# Patient Record
Sex: FEMALE | Race: WHITE | NOT HISPANIC OR LATINO | Employment: FULL TIME | URBAN - METROPOLITAN AREA
[De-identification: names, ages, dates, MRNs, and addresses within clinical notes are randomized per-mention and may not be internally consistent; named-entity substitution may affect disease eponyms.]

---

## 2017-04-20 ENCOUNTER — TELEPHONE (OUTPATIENT)
Dept: PEDIATRICS | Facility: CLINIC | Age: 57
End: 2017-04-20

## 2017-04-20 NOTE — TELEPHONE ENCOUNTER
4/20/2017    Call Regarding Preventive Health Screening Colonoscopy    Attempt 1    Message on voicemail     Comments:       Outreach   CC

## 2017-05-02 NOTE — TELEPHONE ENCOUNTER
5/2/2017    Call Regarding Preventive Health Screening Colonoscopy    Attempt 1    Message on voicemail     Comments:       Outreach   cnt

## 2017-06-17 NOTE — TELEPHONE ENCOUNTER
Call Regarding Preventive Health Screening Colonoscopy    Attempt 3    Message     Comments: patient will call on own time      Outreach   Jolly Villalpando

## 2017-06-23 ENCOUNTER — DOCUMENTATION ONLY (OUTPATIENT)
Dept: LAB | Facility: CLINIC | Age: 57
End: 2017-06-23

## 2017-06-23 DIAGNOSIS — Z13.1 SCREENING FOR DIABETES MELLITUS: ICD-10-CM

## 2017-06-23 DIAGNOSIS — Z11.59 NEED FOR HEPATITIS C SCREENING TEST: ICD-10-CM

## 2017-06-23 DIAGNOSIS — Z13.29 SCREENING FOR THYROID DISORDER: ICD-10-CM

## 2017-06-23 DIAGNOSIS — Z13.6 CARDIOVASCULAR SCREENING; LDL GOAL LESS THAN 130: ICD-10-CM

## 2017-06-23 DIAGNOSIS — I10 ESSENTIAL HYPERTENSION WITH GOAL BLOOD PRESSURE LESS THAN 140/90: Primary | ICD-10-CM

## 2017-06-23 DIAGNOSIS — Z00.00 ENCOUNTER FOR ROUTINE ADULT HEALTH EXAMINATION WITHOUT ABNORMAL FINDINGS: ICD-10-CM

## 2017-06-23 NOTE — PROGRESS NOTES
See there are pending future labs, TSH, BMP and Lipid panel.    Routing to provider to review request for future lab appt.    Elda Cason RN, [facility]

## 2017-06-27 ENCOUNTER — OFFICE VISIT (OUTPATIENT)
Dept: PEDIATRICS | Facility: CLINIC | Age: 57
End: 2017-06-27
Payer: COMMERCIAL

## 2017-06-27 VITALS
HEART RATE: 57 BPM | TEMPERATURE: 98.3 F | WEIGHT: 228 LBS | SYSTOLIC BLOOD PRESSURE: 115 MMHG | HEIGHT: 65 IN | BODY MASS INDEX: 37.99 KG/M2 | OXYGEN SATURATION: 99 % | DIASTOLIC BLOOD PRESSURE: 70 MMHG

## 2017-06-27 DIAGNOSIS — Z00.00 ENCOUNTER FOR ROUTINE ADULT HEALTH EXAMINATION WITHOUT ABNORMAL FINDINGS: Primary | ICD-10-CM

## 2017-06-27 DIAGNOSIS — Z11.59 NEED FOR HEPATITIS C SCREENING TEST: ICD-10-CM

## 2017-06-27 DIAGNOSIS — D22.9 NUMEROUS MOLES: ICD-10-CM

## 2017-06-27 DIAGNOSIS — Z80.8 FAMILY HISTORY OF SKIN CANCER: ICD-10-CM

## 2017-06-27 DIAGNOSIS — I10 ESSENTIAL HYPERTENSION WITH GOAL BLOOD PRESSURE LESS THAN 140/90: ICD-10-CM

## 2017-06-27 DIAGNOSIS — Z12.31 ENCOUNTER FOR SCREENING MAMMOGRAM FOR BREAST CANCER: ICD-10-CM

## 2017-06-27 DIAGNOSIS — Z00.00 ENCOUNTER FOR ROUTINE ADULT HEALTH EXAMINATION WITHOUT ABNORMAL FINDINGS: ICD-10-CM

## 2017-06-27 DIAGNOSIS — Z71.89 ADVANCED DIRECTIVES, COUNSELING/DISCUSSION: ICD-10-CM

## 2017-06-27 DIAGNOSIS — Z12.11 SCREENING FOR COLON CANCER: ICD-10-CM

## 2017-06-27 DIAGNOSIS — Z13.6 CARDIOVASCULAR SCREENING; LDL GOAL LESS THAN 130: ICD-10-CM

## 2017-06-27 LAB
ALBUMIN SERPL-MCNC: 3.9 G/DL (ref 3.4–5)
ALP SERPL-CCNC: 78 U/L (ref 40–150)
ALT SERPL W P-5'-P-CCNC: 24 U/L (ref 0–50)
ANION GAP SERPL CALCULATED.3IONS-SCNC: 7 MMOL/L (ref 3–14)
AST SERPL W P-5'-P-CCNC: 14 U/L (ref 0–45)
BILIRUB SERPL-MCNC: 0.8 MG/DL (ref 0.2–1.3)
BUN SERPL-MCNC: 13 MG/DL (ref 7–30)
CALCIUM SERPL-MCNC: 8.8 MG/DL (ref 8.5–10.1)
CHLORIDE SERPL-SCNC: 108 MMOL/L (ref 94–109)
CHOLEST SERPL-MCNC: 153 MG/DL
CO2 SERPL-SCNC: 28 MMOL/L (ref 20–32)
CREAT SERPL-MCNC: 0.91 MG/DL (ref 0.52–1.04)
CREAT UR-MCNC: 32 MG/DL
GFR SERPL CREATININE-BSD FRML MDRD: 64 ML/MIN/1.7M2
GLUCOSE SERPL-MCNC: 88 MG/DL (ref 70–99)
HDLC SERPL-MCNC: 55 MG/DL
LDLC SERPL CALC-MCNC: 84 MG/DL
MICROALBUMIN UR-MCNC: <5 MG/L
MICROALBUMIN/CREAT UR: NORMAL MG/G CR (ref 0–25)
NONHDLC SERPL-MCNC: 98 MG/DL
POTASSIUM SERPL-SCNC: 4 MMOL/L (ref 3.4–5.3)
PROT SERPL-MCNC: 7.2 G/DL (ref 6.8–8.8)
SODIUM SERPL-SCNC: 143 MMOL/L (ref 133–144)
TRIGL SERPL-MCNC: 71 MG/DL

## 2017-06-27 PROCEDURE — 99396 PREV VISIT EST AGE 40-64: CPT | Performed by: NURSE PRACTITIONER

## 2017-06-27 PROCEDURE — 80053 COMPREHEN METABOLIC PANEL: CPT | Performed by: NURSE PRACTITIONER

## 2017-06-27 PROCEDURE — 86803 HEPATITIS C AB TEST: CPT | Performed by: NURSE PRACTITIONER

## 2017-06-27 PROCEDURE — 80061 LIPID PANEL: CPT | Performed by: NURSE PRACTITIONER

## 2017-06-27 PROCEDURE — 82043 UR ALBUMIN QUANTITATIVE: CPT | Performed by: NURSE PRACTITIONER

## 2017-06-27 PROCEDURE — 36415 COLL VENOUS BLD VENIPUNCTURE: CPT | Performed by: NURSE PRACTITIONER

## 2017-06-27 RX ORDER — LISINOPRIL 10 MG/1
TABLET ORAL
Qty: 90 TABLET | Refills: 3 | Status: SHIPPED | OUTPATIENT
Start: 2017-06-27 | End: 2018-09-11

## 2017-06-27 NOTE — MR AVS SNAPSHOT
After Visit Summary   6/27/2017    Susan Paulino    MRN: [id number]           Patient Information     Date Of Birth          1960        Visit Information        Provider Department      6/27/2017 11:00 AM Nova Azevedo APRN CNP [facility]        Today's Diagnoses     Encounter for routine adult health examination without abnormal findings    -  1    Essential hypertension with goal blood pressure less than 140/90        Need for hepatitis C screening test        Encounter for screening mammogram for breast cancer        Numerous moles        Family history of skin cancer        Advanced directives, counseling/discussion        Screening for colon cancer          Care Instructions    PLAN:   1.   Symptomatic therapy suggested: Continue current medications.  Increase calcium to 1000mg and 800iu Vit D    2.  Orders Placed This Encounter   Medications     lisinopril (PRINIVIL/ZESTRIL) 10 MG tablet     Sig: TAKE 1 TABLET (10 MG) BY MOUTH DAILY     Dispense:  90 tablet     Refill:  3     Orders Placed This Encounter   Procedures     MA Screening Digital Bilateral     Hepatitis C Screen Reflex to HCV RNA Quant and Genotype     DERMATOLOGY REFERRAL     GASTROENTEROLOGY ADULT REF PROCEDURE ONLY       3. Patient needs to follow up in if no improvement,or sooner if worsening of symptoms or other symptoms develop.  FURTHER TESTING:       - mammogram  I will place order. Please call [phone] to schedule.  Will follow up and/or notify patient of  results via My Chart to determine further need for followup  Please call [phone] to make appointment  if you do not hear from referrals in the next few days for colonoscopy     Follow up office visit in one year for annual health maintenance exam, sooner PRN.    Preventive Health Recommendations  Female Ages 50 - 64    Yearly exam: See your health care provider every year in order to  o Review health changes.   o Discuss  preventive care.    o Review your medicines if your doctor has prescribed any.      Get a Pap test every three years (unless you have an abnormal result and your provider advises testing more often).    If you get Pap tests with HPV test, you only need to test every 5 years, unless you have an abnormal result.     You do not need a Pap test if your uterus was removed (hysterectomy) and you have not had cancer.    You should be tested each year for STDs (sexually transmitted diseases) if you're at risk.     Have a mammogram every 1 to 2 years.    Have a colonoscopy at age 50, or have a yearly FIT test (stool test). These exams screen for colon cancer.      Have a cholesterol test every 5 years, or more often if advised.    Have a diabetes test (fasting glucose) every three years. If you are at risk for diabetes, you should have this test more often.     If you are at risk for osteoporosis (brittle bone disease), think about having a bone density scan (DEXA).    Shots: Get a flu shot each year. Get a tetanus shot every 10 years.    Nutrition:     Eat at least 5 servings of fruits and vegetables each day.    Eat whole-grain bread, whole-wheat pasta and brown rice instead of white grains and rice.    Talk to your provider about Calcium and Vitamin D.     Lifestyle    Exercise at least 150 minutes a week (30 minutes a day, 5 days a week). This will help you control your weight and prevent disease.    Limit alcohol to one drink per day.    No smoking.     Wear sunscreen to prevent skin cancer.     See your dentist every six months for an exam and cleaning.    See your eye doctor every 1 to 2 years.    It was a pleasure seeing you today at the [facility]. Thank you for allowing us to care for you today. We truly hope we provided you with the excellent service you deserve. Please let us know if there is anything else we can do for you so we can be sure you are leaving completley satisfied  with your care experience.       General information about your clinic   Clinic Hours Lab Hours (Appointments are required)   Mon-Thurs: 7:30 AM - 7 PM Mon-Thurs: 7:30 AM - 7 PM   Fri: 7:30 AM - 5 PM Fri: 7:30 AM - 5 PM        After Hours Nurse Advise & Appts:  [facility] Nurse Advisors: [phone]  Jose On Call: to make appointments anytime: [phone] On Call Physician: call [phone] and answering service will page the on call physician.        For urgent appointments, please call [phone] and ask for the triage nurse or your care team clinic nurse.  How to contact my care team:  MyChart: [email or web address]   Phone: [phone]   Fax: [phone]       [facility]:   Phone: [phone]  Hours: 8:00 AM - 6:00 PM  Medication Refills:  Call your pharmacy and they will forward the refill to us. Please allow 3 business days for your refills to be completed.       Normal or non-critical lab and imaging results will be communicated to you by MyChart, letter or phone within 7 days.  If you do not hear from us within 10 days, please call the clinic. If you have a critical or abnormal lab result, we will notify you by phone as soon as possible.       We now have PWIC (Pediatric Walk in Care)  Monday-Friday from 7:30-4. Simply walk in and be seen for your urgent needs like cough, fever, rash, diarrhea or vomiting, pink eye, UTI. No appointments needed. Ask one of the team for more information      -Your Care Team:    Dr. Hernán Terrell - Internal Medicine/Pediatrics   Dr. Suresh Cates - Family Medicine  Dr. Lesli Pelletier - Pediatrics  Nova Azevedo CNP - Family Practice Nurse Practitioner  Dr. Judi Herman - Pediatrics  Dr. Angelo Lawrence - Internal Medicine                    Follow-ups after your visit        Additional Services     DERMATOLOGY REFERRAL       Your provider has referred you to: [facility]: [facility] [phone]    [email or web address]    Please be aware that coverage of these services is subject to the terms and limitations of your health insurance plan.  Call member services at your health plan with any benefit or coverage questions.      Please bring the following with you to your appointment:    (1) Any X-Rays, CTs or MRIs which have been performed.  Contact the facility where they were done to arrange for  prior to your scheduled appointment.    (2) List of current medications  (3) This referral request   (4) Any documents/labs given to you for this referral            GASTROENTEROLOGY ADULT REF PROCEDURE ONLY                 Future tests that were ordered for you today     Open Future Orders        Priority Expected Expires Ordered    MA Screening Digital Bilateral Routine  6/27/2018 6/27/2017            Who to contact     If you have questions or need follow up information about today's clinic visit or your schedule please contact [facility] directly at [phone].  Normal or non-critical lab and imaging results will be communicated to you by [employer], letter or phone within 4 business days after the clinic has received the results. If you do not hear from us within 7 days, please contact the clinic through [employer] or phone. If you have a critical or abnormal lab result, we will notify you by phone as soon as possible.  Submit refill requests through [employer] or call your pharmacy and they will forward the refill request to us. Please allow 3 business days for your refill to be completed.          Additional Information About Your Visit        [employer] Information     [employer] gives you secure access to your electronic health record. If you see a primary care provider, you can also send messages to your care team and make appointments. If you have questions, please call your primary care clinic.  If you do not have a primary care provider, please call  "[phone] and they will assist you.      [employer] is an electronic gateway that provides easy, online access to your medical records. With [employer], you can request a clinic appointment, read your test results, renew a prescription or communicate with your care team.     To access your existing account, please contact your [facility] or call [phone] for assistance.        Care EveryWhere ID     This is your Care EveryWhere ID. This could be used by other organizations to access your [place] medical records  [id number]        Your Vitals Were     Pulse Temperature Height Pulse Oximetry Breastfeeding? BMI (Body Mass Index)    57 98.3  F (36.8  C) (Temporal) 5' 4.5\" (1.638 m) 99% No 38.53 kg/m2       Blood Pressure from Last 3 Encounters:   06/27/17 115/70   06/23/16 115/70   05/24/16 147/84    Weight from Last 3 Encounters:   06/27/17 228 lb (103.4 kg)   06/23/16 226 lb 12.8 oz (102.9 kg)   05/24/16 229 lb 14.4 oz (104.3 kg)              We Performed the Following     DERMATOLOGY REFERRAL     GASTROENTEROLOGY ADULT REF PROCEDURE ONLY     Hepatitis C Screen Reflex to HCV RNA Quant and Genotype          Today's Medication Changes          These changes are accurate as of: 6/27/17 11:56 AM.  If you have any questions, ask your nurse or doctor.               These medicines have changed or have updated prescriptions.        Dose/Directions    lisinopril 10 MG tablet   Commonly known as:  PRINIVIL/ZESTRIL   This may have changed:  See the new instructions.   Used for:  Essential hypertension with goal blood pressure less than 140/90   Changed by:  Nova Azevedo APRN CNP        TAKE 1 TABLET (10 MG) BY MOUTH DAILY   Quantity:  90 tablet   Refills:  3            Where to get your medicines      These medications were sent to [facility] [address] [place] [facility]  [address] [facility]  [address] [address] [facility] [address] [facility] [address]     Phone:  [phone]     lisinopril 10 MG " tablet                Primary Care Provider Office Phone # Fax #    Nova Azevedo, LULI [facility] [phone]       [facility] [address]  [place] [address]        Equal Access to Services     CELY ROSA : Hadii brianna ku hadchacortao Soomaali, waaxda luqadaha, qaybta kaalmada adeegyada, waxlisa idiin haylorenn adeabbie riojas elliot silva. So [facility] [phone].    ATENCIÓN: Si habla español, tiene a brody disposición servicios gratuitos de asistencia lingüística. Llame al [phone].    We comply with applicable federal civil rights laws and [place] laws. We do not discriminate on the basis of race, color, national origin, age, disability sex, sexual orientation or gender identity.            Thank you!     Thank you for choosing [facility]  for your care. Our goal is always to provide you with excellent care. Hearing back from our patients is one way we can continue to improve our services. Please take a few minutes to complete the written survey that you may receive in the mail after your visit with us. Thank you!             Your Updated Medication List - Protect others around you: Learn how to safely use, store and throw away your medicines at www.disposemymeds.org.          This list is accurate as of: 6/27/17 11:56 AM.  Always use your most recent med list.                   Brand Name Dispense Instructions for use Diagnosis    lisinopril 10 MG tablet    PRINIVIL/ZESTRIL    90 tablet    TAKE 1 TABLET (10 MG) BY MOUTH DAILY    Essential hypertension with goal blood pressure less than 140/90       MULTIVITAMIN ADULT PO      Take by mouth daily

## 2017-06-27 NOTE — PROGRESS NOTES
SUBJECTIVE:   CC: Susan Paulino is an 56 year old woman who presents for preventive health visit.     Physical   Annual:     Getting at least 3 servings of Calcium per day::  Yes    Bi-annual eye exam::  Yes    Dental care twice a year::  NO    Sleep apnea or symptoms of sleep apnea::  None    Diet::  Regular (no restrictions)    Frequency of exercise::  4-5 days/week    Duration of exercise::  30-45 minutes    Taking medications regularly::  Yes    Medication side effects::  None    Additional concerns today::  YES    Today's PHQ-2 Score: Answers for HPI/ROS submitted by the patient on 6/26/2017   PHQ-2 Score: 0    PHQ-2 ( 1999 [employer]) 6/26/2017   Q1: Little interest or pleasure in doing things Not at all   Q2: Feeling down, depressed or hopeless Not at all   PHQ-2 Score 0       Abuse: Current or Past(Physical, Sexual or Emotional)- No  Do you feel safe in your environment - Yes    Social History   Substance Use Topics     Smoking status: Never Smoker     Smokeless tobacco: Never Used     Alcohol use 0.0 oz/week      Comment: 2     The patient does not drink >3 drinks per day nor >7 drinks per week.    Reviewed orders with patient.  Reviewed health maintenance and updated orders accordingly - Yes  Labs reviewed in EPIC  BP Readings from Last 3 Encounters:   06/27/17 115/70   06/23/16 115/70   05/24/16 147/84    Wt Readings from Last 3 Encounters:   06/27/17 228 lb (103.4 kg)   06/23/16 226 lb 12.8 oz (102.9 kg)   05/24/16 229 lb 14.4 oz (104.3 kg)                 Patient Active Problem List   Diagnosis     Essential hypertension with goal blood pressure less than 140/90     CARDIOVASCULAR SCREENING; LDL GOAL LESS THAN 130     Past Surgical History:   Procedure Laterality Date     DRAIN PILONIDAL CYST SIMPL  1981     ORTHOPEDIC SURGERY      Right knee arthroscopic twice to clean meniscus     XR KNEE ARTHROGRAM RIGHT         Social History   Substance Use Topics     Smoking status: Never Smoker     Smokeless  tobacco: Never Used     Alcohol use 0.0 oz/week      Comment: 2     Family History   Problem Relation Age of Onset     Pancreatic Cancer Mother      Prostate Cancer Father      Hyperlipidemia Father      DIABETES Maternal Grandmother      Parkinsonism Sister      Breast Cancer Sister      Thyroid Disease Sister      DIABETES Maternal Aunt      Colon Cancer Maternal Uncle      Thyroid Disease Sister      Other Cancer Brother      Coronary Artery Disease No family hx of      Hypertension No family hx of      CEREBROVASCULAR DISEASE No family hx of      Depression No family hx of      Anxiety Disorder No family hx of      Asthma No family hx of      Genetic Disorder No family hx of          Current Outpatient Prescriptions   Medication Sig Dispense Refill     lisinopril (PRINIVIL/ZESTRIL) 10 MG tablet TAKE 1 TABLET (10 MG) BY MOUTH DAILY 90 tablet 0     Multiple Vitamins-Minerals (MULTIVITAMIN ADULT PO) Take by mouth daily       No Known Allergies    Patient over age 50, mutual decision to screen reflected in health maintenance.    Pertinent mammograms are reviewed under the imaging tab.  History of abnormal Pap smear: NO - age 30- 65 PAP every 3 years recommended    Reviewed and updated as needed this visit by clinical staff  Tobacco  Allergies  Meds  Med Hx  Surg Hx  Fam Hx  Soc Hx        Reviewed and updated as needed this visit by Provider        Past Medical History:   Diagnosis Date     Hypertension       Past Surgical History:   Procedure Laterality Date     DRAIN PILONIDAL CYST SIMPL  1981     ORTHOPEDIC SURGERY      Right knee arthroscopic twice to clean meniscus     XR KNEE ARTHROGRAM RIGHT         ROS:  CONSTITUTIONAL:NEGATIVE for fever, chills, change in weight  INTEGUMENTARY/SKIN: NEGATIVE for changing lesion  and POSITIVE for brother with recent skin cancer. Has numerous moles    EYES: NEGATIVE for vision changes or irritation  ENT: NEGATIVE for ear, mouth and throat problems  RESP:NEGATIVE for  "significant cough or SOB  BREAST: NEGATIVE for masses, tenderness or discharge  CV: NEGATIVE for chest pain, palpitations or peripheral edema  GI: NEGATIVE for nausea, abdominal pain, heartburn, or change in bowel habits   menopausal female: amenorrhea, no unusual urinary symptoms and no unusual vaginal symptoms  MUSCULOSKELETAL:NEGATIVE for significant arthralgias or myalgia  NEURO: NEGATIVE for weakness, dizziness or paresthesias  ENDOCRINE: NEGATIVE for temperature intolerance, skin/hair changes  HEME/ALLERGY/IMMUNE: NEGATIVE for allergies and anemia  PSYCHIATRIC: NEGATIVE for changes in mood or affect        OBJECTIVE:   /70 (BP Location: Right arm, Patient Position: Sitting, Cuff Size: Adult Large)  Pulse 57  Temp 98.3  F (36.8  C) (Temporal)  Ht 5' 4.5\" (1.638 m)  Wt 228 lb (103.4 kg)  SpO2 99%  Breastfeeding? No  BMI 38.53 kg/m2   Wt Readings from Last 4 Encounters:   06/27/17 228 lb (103.4 kg)   06/23/16 226 lb 12.8 oz (102.9 kg)   05/24/16 229 lb 14.4 oz (104.3 kg)       EXAM:  GENERAL: healthy, alert and no distress  EYES: Eyes grossly normal to inspection, PERRL and conjunctivae and sclerae normal  HENT: ear canals and TM's normal, nose and mouth without ulcers or lesions  NECK: no adenopathy, no asymmetry, masses, or scars and thyroid normal to palpation  RESP: lungs clear to auscultation - no rales, rhonchi or wheezes  BREAST: normal without masses, tenderness or nipple discharge and no palpable axillary masses or adenopathy  CV: regular rate and rhythm, normal S1 S2, no S3 or S4, no murmur, click or rub, no peripheral edema and peripheral pulses strong  ABDOMEN: soft, nontender, no hepatosplenomegaly, no masses and bowel sounds normal   (female): normal female external genitalia, normal urethral meatus, vaginal mucosa pink, moist, well rugated, and normal cervix/adnexa/uterus without masses or discharge  RECTAL: deferred  MS: no gross musculoskeletal defects noted, no edema  SKIN: no " suspicious lesions or rashes  NEURO: Normal strength and tone, mentation intact and speech normal  PSYCH: mentation appears normal, affect normal/bright  LYMPH: no cervical, supraclavicular, axillary, or inguinal adenopathy    ASSESSMENT/PLAN:   Susan was seen today for physical.    Diagnoses and all orders for this visit:    Encounter for routine adult health examination without abnormal findings  -     Hepatitis C Screen Reflex to HCV RNA Quant and Genotype    Essential hypertension with goal blood pressure less than 140/90  -     lisinopril (PRINIVIL/ZESTRIL) 10 MG tablet; TAKE 1 TABLET (10 MG) BY MOUTH DAILY    Need for hepatitis C screening test  -     Hepatitis C Screen Reflex to HCV RNA Quant and Genotype    Encounter for screening mammogram for breast cancer  -     MA Screening Digital Bilateral; Future    Numerous moles  -     DERMATOLOGY REFERRAL    Family history of skin cancer  -     DERMATOLOGY REFERRAL    Advanced directives, counseling/discussion  Advance Care Planning 6/27/2017: ACP Review of Chart / Resources Provided:  Reviewed chart for advance care plan.  Susan Paulino has been provided information and resources to begin or update their advance care plan.  Added by Nova Azevedo    Screening for colon cancer  -     GASTROENTEROLOGY ADULT REF PROCEDURE ONLY        COUNSELING:  Reviewed preventive health counseling, as reflected in patient instructions  Special attention given to:        Regular exercise       Healthy diet/nutrition       Vision screening       Osteoporosis Prevention/Bone Health       Colon cancer screening       Consider Hep C screening for patients born between 1945 and 1965       The 10-year ASCVD risk score (Zoiladillon DOUGHERTY Jr, et al., 2013) is: 1.6%    Values used to calculate the score:      Age: 56 years      Sex: Female      Is Non- : No      Diabetic: No      Tobacco smoker: No      Systolic Blood Pressure: 115 mmHg      Is BP treated: Yes     "  HDL Cholesterol: 55 mg/dL      Total Cholesterol: 153 mg/dL         reports that she has never smoked. She has never used smokeless tobacco.    Estimated body mass index is 38.33 kg/(m^2) as calculated from the following:    Height as of 6/23/16: 5' 4.5\" (1.638 m).    Weight as of 6/23/16: 226 lb 12.8 oz (102.9 kg).   Weight management plan: Discussed healthy diet and exercise guidelines and patient will follow up in 12 months in clinic to re-evaluate.    Counseling Resources:  ATP IV Guidelines  Pooled Cohorts Equation Calculator  Breast Cancer Risk Calculator  FRAX Risk Assessment  ICSI Preventive Guidelines  Dietary Guidelines for Americans, 2010  USDA's MyPlate  ASA Prophylaxis  Lung CA Screening    LULI Jacobsen CNP  [facility]  "

## 2017-06-27 NOTE — PATIENT INSTRUCTIONS
PLAN:   1.   Symptomatic therapy suggested: Continue current medications.  Increase calcium to 1000mg and 800iu Vit D    2.  Orders Placed This Encounter   Medications     lisinopril (PRINIVIL/ZESTRIL) 10 MG tablet     Sig: TAKE 1 TABLET (10 MG) BY MOUTH DAILY     Dispense:  90 tablet     Refill:  3     Orders Placed This Encounter   Procedures     MA Screening Digital Bilateral     Hepatitis C Screen Reflex to HCV RNA Quant and Genotype     DERMATOLOGY REFERRAL     GASTROENTEROLOGY ADULT REF PROCEDURE ONLY       3. Patient needs to follow up in if no improvement,or sooner if worsening of symptoms or other symptoms develop.  FURTHER TESTING:       - mammogram  I will place order. Please call [phone] to schedule.  Will follow up and/or notify patient of  results via My Chart to determine further need for followup  Please call [phone] to make appointment  if you do not hear from referrals in the next few days for colonoscopy     Follow up office visit in one year for annual health maintenance exam, sooner PRN.    Preventive Health Recommendations  Female Ages 50 - 64    Yearly exam: See your health care provider every year in order to  o Review health changes.   o Discuss preventive care.    o Review your medicines if your doctor has prescribed any.      Get a Pap test every three years (unless you have an abnormal result and your provider advises testing more often).    If you get Pap tests with HPV test, you only need to test every 5 years, unless you have an abnormal result.     You do not need a Pap test if your uterus was removed (hysterectomy) and you have not had cancer.    You should be tested each year for STDs (sexually transmitted diseases) if you're at risk.     Have a mammogram every 1 to 2 years.    Have a colonoscopy at age 50, or have a yearly FIT test (stool test). These exams screen for colon cancer.      Have a cholesterol test every 5 years, or more often if advised.    Have a diabetes  test (fasting glucose) every three years. If you are at risk for diabetes, you should have this test more often.     If you are at risk for osteoporosis (brittle bone disease), think about having a bone density scan (DEXA).    Shots: Get a flu shot each year. Get a tetanus shot every 10 years.    Nutrition:     Eat at least 5 servings of fruits and vegetables each day.    Eat whole-grain bread, whole-wheat pasta and brown rice instead of white grains and rice.    Talk to your provider about Calcium and Vitamin D.     Lifestyle    Exercise at least 150 minutes a week (30 minutes a day, 5 days a week). This will help you control your weight and prevent disease.    Limit alcohol to one drink per day.    No smoking.     Wear sunscreen to prevent skin cancer.     See your dentist every six months for an exam and cleaning.    See your eye doctor every 1 to 2 years.    It was a pleasure seeing you today at the [facility]. Thank you for allowing us to care for you today. We truly hope we provided you with the excellent service you deserve. Please let us know if there is anything else we can do for you so we can be sure you are leaving completley satisfied with your care experience.       General information about your clinic   Clinic Hours Lab Hours (Appointments are required)   Mon-Thurs: 7:30 AM - 7 PM Mon-Thurs: 7:30 AM - 7 PM   Fri: 7:30 AM - 5 PM Fri: 7:30 AM - 5 PM        After Hours Nurse Advise & Appts:  [facility] Nurse Advisors: [phone]  [facility] On Call: to make appointments anytime: [phone] On Call Physician: call [phone] and answering service will page the on call physician.        For urgent appointments, please call [phone] and ask for the triage nurse or your care team clinic nurse.  How to contact my care team:  MyChart: [email or web address]   Phone: [phone]   Fax: [phone]       [facility]:   Phone: [phone]  Hours: 8:00 AM - 6:00 PM   Medication Refills:  Call your pharmacy and they will forward the refill to us. Please allow 3 business days for your refills to be completed.       Normal or non-critical lab and imaging results will be communicated to you by MyChart, letter or phone within 7 days.  If you do not hear from us within 10 days, please call the clinic. If you have a critical or abnormal lab result, we will notify you by phone as soon as possible.       We now have PWIC (Pediatric Walk in Care)  Monday-Friday from 7:30-4. Simply walk in and be seen for your urgent needs like cough, fever, rash, diarrhea or vomiting, pink eye, UTI. No appointments needed. Ask one of the team for more information      -Your Care Team:    Dr. Hernán Terrell - Internal Medicine/Pediatrics   Dr. Suersh Cates - Family Medicine  Dr. Lesli Pelletier - Pediatrics  Nova Azevedo CNP - Family Practice Nurse Practitioner  Dr. Judi Herman - Pediatrics  Dr. Angelo Lawrence - Internal Medicine

## 2017-06-27 NOTE — NURSING NOTE
"Chief Complaint   Patient presents with     Physical     AFE       Initial /70 (BP Location: Right arm, Patient Position: Sitting, Cuff Size: Adult Large)  Pulse 57  Temp 98.3  F (36.8  C) (Temporal)  Ht 5' 4.5\" (1.638 m)  Wt 228 lb (103.4 kg)  SpO2 99%  Breastfeeding? No  BMI 38.53 kg/m2 Estimated body mass index is 38.53 kg/(m^2) as calculated from the following:    Height as of this encounter: 5' 4.5\" (1.638 m).    Weight as of this encounter: 228 lb (103.4 kg).  Medication Reconciliation: complete      BRYAN Armstrong      "

## 2017-06-27 NOTE — PROGRESS NOTES
Guero Paulino,    Attached are your test results.  -Liver and gallbladder tests are normal. (ALT,AST, Alk phos, bilirubin), kidney function is normal (Cr, GFR), Sodium is normal, Potassium is normal, Calcium is normal, Glucose is normal (diabetes screening test).   -Cholesterol levels (LDL,HDL, Triglycerides) are normal.  ADVISE: rechecking in 1 year.  -Microalbumin (urine protein) test is normal.  ADVISE: recheck annually   Please contact us if you have any questions.    oNva Azevedo, CNP

## 2017-06-28 LAB — HCV AB SERPL QL IA: NORMAL

## 2017-06-29 NOTE — PROGRESS NOTES
Guero Paulino,    Attached are your test results.  -Hepatitis C antibody screen test shows no signs of a previous hepatitis C infection.   Please contact us if you have any questions.    Nova Azevedo, CNP

## 2017-07-07 ENCOUNTER — RADIANT APPOINTMENT (OUTPATIENT)
Dept: MAMMOGRAPHY | Facility: CLINIC | Age: 57
End: 2017-07-07
Attending: NURSE PRACTITIONER
Payer: COMMERCIAL

## 2017-07-07 DIAGNOSIS — Z12.31 ENCOUNTER FOR SCREENING MAMMOGRAM FOR BREAST CANCER: ICD-10-CM

## 2017-07-07 PROCEDURE — 77063 BREAST TOMOSYNTHESIS BI: CPT | Performed by: RADIOLOGY

## 2017-07-07 PROCEDURE — G0202 SCR MAMMO BI INCL CAD: HCPCS | Performed by: RADIOLOGY

## 2018-02-07 ENCOUNTER — TRANSFERRED RECORDS (OUTPATIENT)
Dept: HEALTH INFORMATION MANAGEMENT | Facility: CLINIC | Age: 58
End: 2018-02-07

## 2018-02-24 ENCOUNTER — TELEPHONE (OUTPATIENT)
Dept: PEDIATRICS | Facility: CLINIC | Age: 58
End: 2018-02-24

## 2018-09-11 DIAGNOSIS — I10 ESSENTIAL HYPERTENSION WITH GOAL BLOOD PRESSURE LESS THAN 140/90: ICD-10-CM

## 2018-09-11 RX ORDER — LISINOPRIL 10 MG/1
TABLET ORAL
Qty: 30 TABLET | Refills: 0 | Status: SHIPPED | OUTPATIENT
Start: 2018-09-11 | End: 2018-12-07

## 2018-09-11 NOTE — LETTER
September 11, 2018      Susan Paulino  [address] [place] [address]              Dear Susan,      We recently received a call from your pharmacy requesting a refill of your medication. We have provided a 30 day refill of your medication, lisinopril (PRINIVIL/ZESTRIL) 10 MG tablet, as requested.      A review of your chart indicates that your last office visit was on 6/27/2017.  An appointment is required yearly with your provider.    We have authorized a one time refill of your medication to allow time for you to schedule.     Please come in fasting for the appointment. Fasting entails nothing to eat or drink 8 hours prior to your appointment; with the exception on water. You may take your medication the day of the appointment.    Please call the clinic to schedule your appointment.    Thank you for taking an active role in your healthcare.      Sincerely,    [facility]

## 2018-09-11 NOTE — TELEPHONE ENCOUNTER
lisinopril (PRINIVIL/ZESTRIL) 10 MG tablet 90 tablet 3 6/27/2017  No   Sig: TAKE 1 TABLET (10 MG) BY MOUTH DAILY     Last OV with GUS Azevedo CNP: 6/27/2017    No future apts.     BP Readings from Last 3 Encounters:   06/27/17 115/70   06/23/16 115/70   05/24/16 147/84     Creatinine   Date Value Ref Range Status   06/27/2017 0.91 0.52 - 1.04 mg/dL Final     Potassium   Date Value Ref Range Status   06/27/2017 4.0 3.4 - 5.3 mmol/L Final     ACE Inhibitors (Including Combos) Protocol Failed9/11 1:48 AM   Blood pressure under 140/90 in past 12 months    Recent (12 mo) or future (30 days) visit within the authorizing provider's specialty    Normal serum creatinine on file in past 12 months    Normal serum potassium on file in past 12 months    Patient is age 18 or older    No active pregnancy on record    No positive pregnancy test in past 12 months     Patient due for an annual physical and annual lab work. Serenity refill. Patient notified by letter.     Phoebe Boone RN

## 2018-10-11 DIAGNOSIS — I10 ESSENTIAL HYPERTENSION WITH GOAL BLOOD PRESSURE LESS THAN 140/90: ICD-10-CM

## 2018-10-11 NOTE — TELEPHONE ENCOUNTER
Routing to scheduling to please call patient to schedule an annual physical with Nova Azevedo CNP.    Please route back to primary care once scheduled or for any questions/concerns. A second norris refill will be authorized once scheduled.    lisinopril (PRINIVIL/ZESTRIL) 10 MG tablet 30 tablet 0 9/11/2018  No   Sig: TAKE 1 TABLET BY MOUTH DAILY   Class: E-Prescribe   Notes to Pharmacy: Patient due for an annual physical and annual lab work. Norris refill. Patient notified by letter.     Last OV with GUS Azevedo CNP: 6/27/2017    No future apts.     BP Readings from Last 3 Encounters:   06/27/17 115/70   06/23/16 115/70   05/24/16 147/84     Creatinine   Date Value Ref Range Status   06/27/2017 0.91 0.52 - 1.04 mg/dL Final     Potassium   Date Value Ref Range Status   06/27/2017 4.0 3.4 - 5.3 mmol/L Final     ACE Inhibitors (Including Combos) Protocol Xdslox47/11 2:08 AM   Blood pressure under 140/90 in past 12 months    Recent (12 mo) or future (30 days) visit within the authorizing provider's specialty    Normal serum creatinine on file in past 12 months    Normal serum potassium on file in past 12 months    Patient is age 18 or older    No active pregnancy on record    No positive pregnancy test in past 12 months     Phoebe Boone RN

## 2018-10-11 NOTE — LETTER
October 19, 2018      Susan Paulino  [address] [place] [address]              Dear Susan,      The refill request made by your pharmacy was received at the clinic.     Refused Prescriptions:                         lisinopril (PRINIVIL/ZESTRIL) 10 MG tablet        Sig: TAKE 1 TABLET BY MOUTH EVERY DAY  Refused By: ROYA VIEYRA  Reason for Refusal: Patient needs appointment    At this time you are overdue in the clinic for a follow up appointment. Your refill request has been denied until you are seen for an office visit. Please contact the clinic at [phone] to schedule an appointment as soon as possible.    Sincerely,    Your [facility] Team

## 2018-10-11 NOTE — TELEPHONE ENCOUNTER
Attempt # 2    Left message for patient stating prescription request received.  Serenity refill was given last month. Advised patient to call clinic to schedule Physical appointment and fasting labs with Judith Morris. Clinic number and Mychart option given.    Farhana Downs  Primary Care   [facility] [place]

## 2018-10-19 RX ORDER — LISINOPRIL 10 MG/1
TABLET ORAL
Qty: 30 TABLET | Refills: 0 | OUTPATIENT
Start: 2018-10-19

## 2018-10-19 NOTE — TELEPHONE ENCOUNTER
Patient hasn't responded to voicemails or read [facility].    Routing back to clinic to review.    Farhana Downs  Primary Care   [facility] [place]

## 2018-10-19 NOTE — TELEPHONE ENCOUNTER
Routing to PCP for review. 2nd norris refill pending.     Last OV with PCP: 6/27/2017    Norris refill and letter sent on: 9/11/18     attempted to reach patient by phone 3x's plus RADSON[employer] message with no response from patient on: 10/11-10/18    Phoebe Boone RN

## 2018-11-21 ENCOUNTER — DOCUMENTATION ONLY (OUTPATIENT)
Dept: LAB | Facility: CLINIC | Age: 58
End: 2018-11-21

## 2018-11-21 DIAGNOSIS — I10 ESSENTIAL HYPERTENSION WITH GOAL BLOOD PRESSURE LESS THAN 140/90: ICD-10-CM

## 2018-11-21 DIAGNOSIS — Z13.6 CARDIOVASCULAR SCREENING; LDL GOAL LESS THAN 130: Primary | ICD-10-CM

## 2018-11-21 DIAGNOSIS — Z13.1 SCREENING FOR DIABETES MELLITUS: ICD-10-CM

## 2018-11-21 DIAGNOSIS — Z13.29 SCREENING FOR THYROID DISORDER: ICD-10-CM

## 2018-11-21 DIAGNOSIS — Z00.00 ENCOUNTER FOR ROUTINE ADULT HEALTH EXAMINATION WITHOUT ABNORMAL FINDINGS: ICD-10-CM

## 2018-11-21 DIAGNOSIS — Z12.31 ENCOUNTER FOR SCREENING MAMMOGRAM FOR BREAST CANCER: ICD-10-CM

## 2018-11-21 NOTE — PROGRESS NOTES
Pending lab orders for 11/26/18 (physical). Please review, sign, and close encounter. Thank you. Phoeeb Boone RN

## 2018-11-26 DIAGNOSIS — Z00.00 ENCOUNTER FOR ROUTINE ADULT HEALTH EXAMINATION WITHOUT ABNORMAL FINDINGS: ICD-10-CM

## 2018-11-26 DIAGNOSIS — Z13.1 SCREENING FOR DIABETES MELLITUS: ICD-10-CM

## 2018-11-26 DIAGNOSIS — Z13.6 CARDIOVASCULAR SCREENING; LDL GOAL LESS THAN 130: ICD-10-CM

## 2018-11-26 DIAGNOSIS — Z13.29 SCREENING FOR THYROID DISORDER: ICD-10-CM

## 2018-11-26 DIAGNOSIS — R79.89 ELEVATED TSH: Primary | ICD-10-CM

## 2018-11-26 DIAGNOSIS — I10 ESSENTIAL HYPERTENSION WITH GOAL BLOOD PRESSURE LESS THAN 140/90: ICD-10-CM

## 2018-11-26 LAB
ALBUMIN SERPL-MCNC: 3.6 G/DL (ref 3.4–5)
ALP SERPL-CCNC: 82 U/L (ref 40–150)
ALT SERPL W P-5'-P-CCNC: 30 U/L (ref 0–50)
ANION GAP SERPL CALCULATED.3IONS-SCNC: 5 MMOL/L (ref 3–14)
AST SERPL W P-5'-P-CCNC: 18 U/L (ref 0–45)
BILIRUB SERPL-MCNC: 0.6 MG/DL (ref 0.2–1.3)
BUN SERPL-MCNC: 13 MG/DL (ref 7–30)
CALCIUM SERPL-MCNC: 8.6 MG/DL (ref 8.5–10.1)
CHLORIDE SERPL-SCNC: 107 MMOL/L (ref 94–109)
CHOLEST SERPL-MCNC: 176 MG/DL
CO2 SERPL-SCNC: 27 MMOL/L (ref 20–32)
CREAT SERPL-MCNC: 0.77 MG/DL (ref 0.52–1.04)
CREAT UR-MCNC: 69 MG/DL
GFR SERPL CREATININE-BSD FRML MDRD: 78 ML/MIN/1.7M2
GLUCOSE SERPL-MCNC: 95 MG/DL (ref 70–99)
HDLC SERPL-MCNC: 53 MG/DL
LDLC SERPL CALC-MCNC: 97 MG/DL
MICROALBUMIN UR-MCNC: <5 MG/L
MICROALBUMIN/CREAT UR: NORMAL MG/G CR (ref 0–25)
NONHDLC SERPL-MCNC: 123 MG/DL
POTASSIUM SERPL-SCNC: 3.8 MMOL/L (ref 3.4–5.3)
PROT SERPL-MCNC: 7 G/DL (ref 6.8–8.8)
SODIUM SERPL-SCNC: 139 MMOL/L (ref 133–144)
T4 FREE SERPL-MCNC: 0.92 NG/DL (ref 0.76–1.46)
TRIGL SERPL-MCNC: 132 MG/DL
TSH SERPL DL<=0.005 MIU/L-ACNC: 7.69 MU/L (ref 0.4–4)

## 2018-11-26 PROCEDURE — 82043 UR ALBUMIN QUANTITATIVE: CPT | Performed by: NURSE PRACTITIONER

## 2018-11-26 PROCEDURE — 84443 ASSAY THYROID STIM HORMONE: CPT | Performed by: NURSE PRACTITIONER

## 2018-11-26 PROCEDURE — 84439 ASSAY OF FREE THYROXINE: CPT | Performed by: NURSE PRACTITIONER

## 2018-11-26 PROCEDURE — 80053 COMPREHEN METABOLIC PANEL: CPT | Performed by: NURSE PRACTITIONER

## 2018-11-26 PROCEDURE — 36415 COLL VENOUS BLD VENIPUNCTURE: CPT | Performed by: NURSE PRACTITIONER

## 2018-11-26 PROCEDURE — 80061 LIPID PANEL: CPT | Performed by: NURSE PRACTITIONER

## 2018-11-28 NOTE — PROGRESS NOTES
Guero Paulino,    Attached are your test results.  -Cholesterol levels (LDL,HDL, Triglycerides) are normal.  ADVISE: rechecking in 1 year.   -Liver and gallbladder tests are normal (ALT,AST, Alk phos, bilirubin), kidney function is normal (Cr, GFR), sodium is normal, potassium is normal, calcium is normal, glucose is normal.  -TSH (thyroid stimulating hormone) level can  indicates hypothyroidism (an underactive thyroid).  This can cause a number of symptoms including weight gain, fatigue, high cholesterol, constipation, hair loss, cold intolerance).  ADVISE:rechecking your TSH in 1 month.   -Microalbumin (urine protein) test is normal.  ADVISE: rechecking this annually.   Please contact us if you have any questions.    Nova Azevedo, CNP

## 2018-12-07 ENCOUNTER — OFFICE VISIT (OUTPATIENT)
Dept: PEDIATRICS | Facility: CLINIC | Age: 58
End: 2018-12-07
Payer: COMMERCIAL

## 2018-12-07 VITALS
HEART RATE: 75 BPM | DIASTOLIC BLOOD PRESSURE: 70 MMHG | HEIGHT: 65 IN | TEMPERATURE: 98.8 F | SYSTOLIC BLOOD PRESSURE: 132 MMHG | OXYGEN SATURATION: 99 % | WEIGHT: 229.6 LBS | BODY MASS INDEX: 38.25 KG/M2

## 2018-12-07 DIAGNOSIS — Z00.00 ENCOUNTER FOR ROUTINE ADULT HEALTH EXAMINATION WITHOUT ABNORMAL FINDINGS: Primary | ICD-10-CM

## 2018-12-07 DIAGNOSIS — I10 ESSENTIAL HYPERTENSION WITH GOAL BLOOD PRESSURE LESS THAN 140/90: ICD-10-CM

## 2018-12-07 DIAGNOSIS — Z12.11 SCREENING FOR COLON CANCER: ICD-10-CM

## 2018-12-07 DIAGNOSIS — Z12.31 ENCOUNTER FOR SCREENING MAMMOGRAM FOR BREAST CANCER: ICD-10-CM

## 2018-12-07 DIAGNOSIS — Z23 NEED FOR PROPHYLACTIC VACCINATION AND INOCULATION AGAINST INFLUENZA: ICD-10-CM

## 2018-12-07 PROCEDURE — 90471 IMMUNIZATION ADMIN: CPT | Performed by: NURSE PRACTITIONER

## 2018-12-07 PROCEDURE — 90682 RIV4 VACC RECOMBINANT DNA IM: CPT | Performed by: NURSE PRACTITIONER

## 2018-12-07 PROCEDURE — 99396 PREV VISIT EST AGE 40-64: CPT | Mod: 25 | Performed by: NURSE PRACTITIONER

## 2018-12-07 RX ORDER — LISINOPRIL 10 MG/1
10 TABLET ORAL DAILY
Qty: 90 TABLET | Refills: 3 | Status: SHIPPED | OUTPATIENT
Start: 2018-12-07 | End: 2019-11-21

## 2018-12-07 NOTE — PATIENT INSTRUCTIONS
PLAN:   1.   Symptomatic therapy suggested: Increase calcium to 1000mg and 1000iu Vit D  Continue current medications.  Start on Lotrimin cream for your feet   2.  Orders Placed This Encounter   Medications     lisinopril (PRINIVIL/ZESTRIL) 10 MG tablet     Sig: Take 1 tablet (10 mg) by mouth daily     Dispense:  90 tablet     Refill:  3     Orders Placed This Encounter   Procedures     MA Screening Digital Bilateral     FLU VACCINE, (RIV4) RECOMBINANT HA  , IM (FluBlok, egg free) [27782]- >18 YRS ([facility] recommended  50-64 YRS)     Vaccine Administration, Initial [76450]     GASTROENTEROLOGY ADULT REF PROCEDURE ONLY [facility] [phone]; No Provider Preference       3. Patient needs to follow up in if no improvement,or sooner if worsening of symptoms or other symptoms develop.  FURTHER TESTING:       - mammogram  Refer for colon  Work on weight loss  Regular exercise  Will follow up and/or notify patient of  results via My Chart to determine further need for followup  Follow up office visit in one year for annual health maintenance exam, sooner PRN.    Preventive Health Recommendations  Female Ages 50 - 64    Yearly exam: See your health care provider every year in order to  o Review health changes.   o Discuss preventive care.    o Review your medicines if your doctor has prescribed any.      Get a Pap test every three years (unless you have an abnormal result and your provider advises testing more often).    If you get Pap tests with HPV test, you only need to test every 5 years, unless you have an abnormal result.     You do not need a Pap test if your uterus was removed (hysterectomy) and you have not had cancer.    You should be tested each year for STDs (sexually transmitted diseases) if you're at risk.     Have a mammogram every 1 to 2 years.    Have a colonoscopy at age 50, or have a yearly FIT test (stool test). These exams screen for colon cancer.      Have a cholesterol test every 5 years, or more  often if advised.    Have a diabetes test (fasting glucose) every three years. If you are at risk for diabetes, you should have this test more often.     If you are at risk for osteoporosis (brittle bone disease), think about having a bone density scan (DEXA).    Shots: Get a flu shot each year. Get a tetanus shot every 10 years.    Nutrition:     Eat at least 5 servings of fruits and vegetables each day.    Eat whole-grain bread, whole-wheat pasta and brown rice instead of white grains and rice.    Get adequate Calcium and Vitamin D.     Lifestyle    Exercise at least 150 minutes a week (30 minutes a day, 5 days a week). This will help you control your weight and prevent disease.    Limit alcohol to one drink per day.    No smoking.     Wear sunscreen to prevent skin cancer.     See your dentist every six months for an exam and cleaning.    See your eye doctor every 1 to 2 years.  It was a pleasure seeing you today at the [facility]. Thank you for allowing us to care for you today. We truly hope we provided you with the excellent service you deserve. Please let us know if there is anything else we can do for you so we can be sure you are leaving completley satisfied with your care experience.       General information about your clinic   Clinic Hours Lab Hours (Appointments are required)   Mon-Thurs: 7:30 AM - 7 PM Mon-Thurs: 7:30 AM - 7 PM   Fri: 7:30 AM - 5 PM Fri: 7:30 AM - 5 PM        After Hours Nurse Advise & Appts:  [facility] Nurse Advisors: [phone]  [facility] On Call: to make appointments anytime: [phone] On Call Physician: call [phone] and answering service will page the on call physician.        For urgent appointments, please call [phone] and ask for the triage nurse or your care team clinic nurse.  How to contact my care team:  MyChart: [email or web address]   Phone: [phone]   Fax: [phone]       [facility]:   Phone:  [phone]  Hours: 8:00 AM - 6:00 PM  Medication Refills:  Call your pharmacy and they will forward the refill to us. Please allow 3 business days for your refills to be completed.       Normal or non-critical lab and imaging results will be communicated to you by MyChart, letter or phone within 7 days.  If you do not hear from us within 10 days, please call the clinic. If you have a critical or abnormal lab result, we will notify you by phone as soon as possible.       We now have PWIC (Pediatric Walk in Care)  Monday-Friday from 7:30-4. Simply walk in and be seen for your urgent needs like cough, fever, rash, diarrhea or vomiting, pink eye, UTI. No appointments needed. Ask one of the team for more information      -Your Care Team:    Dr. Hernán Terrell - Internal Medicine/Pediatrics   Dr. Suresh Cates - Family Medicine  Dr. Lesli Pelletier - Pediatrics  Dr. Judi Herman - Pediatrics  Nova Azevedo CNP - Family Practice Nurse Practitioner                 Athlete s Foot    Athlete s foot (tinea pedis) is caused by a fungal infection in the skin. It affects the skin between the toes, causing cracks in the skin called fissures. It can also affect the bottom of the foot where it causes dry white scales and peeling of the skin. This infection is more likely to occur when the foot is in hot, sweaty socks and shoes for long periods of time. You may feel itching and burning between your toes. This infection is treated with skin creams or medicine taken by mouth.  Home care  The following are general care guidelines:    It is important to keep the feet dry. Use absorbent cotton socks and change them if they become sweaty. Or wear an open-toe shoe or sandal. Wash the feet at least once a day with soap and water.    Apply the antifungal cream as prescribed. Some antifungal creams are available without a prescription.    It may take a week before the rash starts to improve. It can take about 3 to 4 weeks to completely clear.  Continue the medicine until the rash is all gone.    Use over-the-counter antifungal powders or sprays on your feet after exposure to high-risk environments, such as public showers, gyms, and locker rooms. This can help prevent future infections. Wearing appropriate shoes in these situations can help.  Prevention  The following tips may help prevent athlete s foot:    Don't share shoes or socks with someone who has athlete's foot.    Don't walk barefoot in places where a fungal infection can spread quickly such as locker rooms, showers, and swimming pools.    Change socks regularly.    Alternate shoes to assist in drying.  Follow-up care  Follow up with your healthcare provider as recommended if the rash does not improve after 10 days of treatment, or if the rash continues to spread.  When to seek medical care  Get medical attention right away if any of the following occur:    Fever of 100.4 F (38 C) or higher, or as directed    Increasing redness or swelling of the foot    Infection comes back soon after treatment    Pus draining from cracks in the skin  Date Last Reviewed: 8/1/2016 2000-2018 [email or web address]. [address] [place] [address], [place] [address]. All rights reserved. This information is not intended as a substitute for professional medical care. Always follow your healthcare professional's instructions.

## 2018-12-07 NOTE — PROGRESS NOTES
SUBJECTIVE:   CC: Susan Paulino is an 58 year old woman who presents for preventive health visit.     Physical   Annual:     Getting at least 3 servings of Calcium per day:  Yes    Bi-annual eye exam:  Yes    Dental care twice a year:  Yes    Sleep apnea or symptoms of sleep apnea:  Daytime drowsiness    Diet:  Regular (no restrictions)    Frequency of exercise:  2-3 days/week    Duration of exercise:  45-60 minutes    Taking medications regularly:  Yes    Medication side effects:  None    Additional concerns today:  Yes    PHQ-2 Total Score: 0        Today's PHQ-2 Score:   PHQ-2 ( 1999 [employer]) 12/7/2018   Q1: Little interest or pleasure in doing things 0   Q2: Feeling down, depressed or hopeless 0   PHQ-2 Score 0   Q1: Little interest or pleasure in doing things -   Q2: Feeling down, depressed or hopeless -   PHQ-2 Score -       Abuse: Current or Past(Physical, Sexual or Emotional)- No  Do you feel safe in your environment? Yes    Social History   Substance Use Topics     Smoking status: Never Smoker     Smokeless tobacco: Never Used     Alcohol use 0.0 oz/week      Comment: 2     Alcohol Use 12/6/2018   If you drink alcohol do you typically have greater than 3 drinks per day OR greater than 7 drinks per week? No   No flowsheet data found.    Reviewed orders with patient.  Reviewed health maintenance and updated orders accordingly - Yes  Labs reviewed in EPIC  BP Readings from Last 3 Encounters:   12/07/18 132/70   06/27/17 115/70   06/23/16 115/70    Wt Readings from Last 3 Encounters:   12/07/18 229 lb 9.6 oz (104.1 kg)   06/27/17 228 lb (103.4 kg)   06/23/16 226 lb 12.8 oz (102.9 kg)                  Patient Active Problem List   Diagnosis     Essential hypertension with goal blood pressure less than 140/90     CARDIOVASCULAR SCREENING; LDL GOAL LESS THAN 130     Family history of skin cancer     Past Surgical History:   Procedure Laterality Date     DRAIN PILONIDAL CYST SIMPL  1981     ORTHOPEDIC  SURGERY      Right knee arthroscopic twice to clean meniscus     XR KNEE ARTHROGRAM RIGHT         Social History   Substance Use Topics     Smoking status: Never Smoker     Smokeless tobacco: Never Used     Alcohol use 0.0 oz/week      Comment: 2     Family History   Problem Relation Age of Onset     Pancreatic Cancer Mother      Prostate Cancer Father      Hyperlipidemia Father      Diabetes Maternal Grandmother      Parkinsonism Sister      Breast Cancer Sister      Thyroid Disease Sister      Diabetes Maternal Aunt      Colon Cancer Maternal Uncle      Thyroid Disease Sister      Other Cancer Brother      Parkinsonism Sister      Coronary Artery Disease No family hx of      Hypertension No family hx of      Cerebrovascular Disease No family hx of      Depression No family hx of      Anxiety Disorder No family hx of      Asthma No family hx of      Genetic Disorder No family hx of          Current Outpatient Prescriptions   Medication Sig Dispense Refill     lisinopril (PRINIVIL/ZESTRIL) 10 MG tablet TAKE 1 TABLET BY MOUTH DAILY 30 tablet 0     Multiple Vitamins-Minerals (MULTIVITAMIN ADULT PO) Take by mouth daily       No Known Allergies    Mammogram Screening: Patient over age 50, mutual decision to screen reflected in health maintenance.    Pertinent mammograms are reviewed under the imaging tab.  History of abnormal Pap smear: NO - age 30- 65 PAP every 3 years recommended  PAP / HPV 6/23/2016   PAP NIL     Reviewed and updated as needed this visit by clinical staff  Tobacco  Allergies  Meds  Med Hx  Surg Hx  Fam Hx  Soc Hx        Reviewed and updated as needed this visit by Provider        Past Medical History:   Diagnosis Date     Family history of skin cancer 6/27/2017     Family history of skin cancer      Hypertension       Past Surgical History:   Procedure Laterality Date     DRAIN PILONIDAL CYST SIMPL  1981     ORTHOPEDIC SURGERY      Right knee arthroscopic twice to clean meniscus     XR KNEE  "ARTHROGRAM RIGHT         Review of Systems  CONSTITUTIONAL:NEGATIVE for fever, chills, change in weight  INTEGUMENTARY/SKIN: NEGATIVE for worrisome rashes, moles or lesions  EYES: NEGATIVE for vision changes or irritation  ENT: NEGATIVE for ear, mouth and throat problems  RESP:NEGATIVE for significant cough or SOB  BREAST: NEGATIVE for masses, tenderness or discharge  CV: NEGATIVE for chest pain, palpitations or peripheral edema  GI: NEGATIVE for nausea, abdominal pain, heartburn, or change in bowel habits   menopausal female: amenorrhea, no unusual urinary symptoms and no unusual vaginal symptoms  MUSCULOSKELETAL:NEGATIVE for significant arthralgias or myalgia  NEURO: NEGATIVE for weakness, dizziness or paresthesias  ENDOCRINE: NEGATIVE for temperature intolerance, skin/hair changes  HEME/ALLERGY/IMMUNE: NEGATIVE for bleeding problems  PSYCHIATRIC: NEGATIVE for changes in mood or affect      OBJECTIVE:   /70 (BP Location: Right arm, Patient Position: Sitting, Cuff Size: Adult Large)  Pulse 75  Temp 98.8  F (37.1  C) (Temporal)  Ht 5' 4.5\" (1.638 m)  Wt 229 lb 9.6 oz (104.1 kg)  SpO2 99%  Breastfeeding? No  BMI 38.8 kg/m2  Physical Exam  GENERAL APPEARANCE: alert, no distress and obese  EYES: Eyes grossly normal to inspection, PERRL and conjunctivae and sclerae normal  HENT: ear canals and TM's normal, nose and mouth without ulcers or lesions, oropharynx clear and oral mucous membranes moist  NECK: no adenopathy, no asymmetry, masses, or scars and thyroid normal to palpation  RESP: lungs clear to auscultation - no rales, rhonchi or wheezes  BREAST: normal without masses, tenderness or nipple discharge and no palpable axillary masses or adenopathy  CV: regular rates and rhythm, no murmur, click or rub, no peripheral edema and peripheral pulses strong  ABDOMEN: soft, nontender, no hepatosplenomegaly, no masses and bowel sounds normal   (female): normal female external genitalia, normal urethral " meatus, vaginal mucosal atrophy noted, normal cervix, adnexae, and uterus without masses or abnormal discharge  MS: no musculoskeletal defects are noted and gait is age appropriate without ataxia  SKIN: no suspicious lesions or rashes  : there is some increased scaling and white maceration between the toes and some dry thickened cracking on the plantar aspect of both feet.  NEURO: Normal strength and tone, sensory exam grossly normal, mentation intact and speech normal  PSYCH: mentation appears normal and affect normal/bright    Diagnostic Test Results:  Results for orders placed or performed in visit on 11/26/18   Lipid panel reflex to direct LDL Fasting   Result Value Ref Range    Cholesterol 176 <200 mg/dL    Triglycerides 132 <150 mg/dL    HDL Cholesterol 53 >49 mg/dL    LDL Cholesterol Calculated 97 <100 mg/dL    Non HDL Cholesterol 123 <130 mg/dL   **Comprehensive metabolic panel FUTURE anytime   Result Value Ref Range    Sodium 139 133 - 144 mmol/L    Potassium 3.8 3.4 - 5.3 mmol/L    Chloride 107 94 - 109 mmol/L    Carbon Dioxide 27 20 - 32 mmol/L    Anion Gap 5 3 - 14 mmol/L    Glucose 95 70 - 99 mg/dL    Urea Nitrogen 13 7 - 30 mg/dL    Creatinine 0.77 0.52 - 1.04 mg/dL    GFR Estimate 78 >60 mL/min/1.7m2    GFR Estimate If Black >90 >60 mL/min/1.7m2    Calcium 8.6 8.5 - 10.1 mg/dL    Bilirubin Total 0.6 0.2 - 1.3 mg/dL    Albumin 3.6 3.4 - 5.0 g/dL    Protein Total 7.0 6.8 - 8.8 g/dL    Alkaline Phosphatase 82 40 - 150 U/L    ALT 30 0 - 50 U/L    AST 18 0 - 45 U/L   **TSH with free T4 reflex FUTURE anytime   Result Value Ref Range    TSH 7.69 (H) 0.40 - 4.00 mU/L   Albumin Random Urine Quantitative with Creat Ratio   Result Value Ref Range    Creatinine Urine 69 mg/dL    Albumin Urine mg/L <5 mg/L    Albumin Urine mg/g Cr Unable to calculate due to low value 0 - 25 mg/g Cr   T4 free   Result Value Ref Range    T4 Free 0.92 0.76 - 1.46 ng/dL       ASSESSMENT/PLAN:   Susan was seen today for  physical.    Diagnoses and all orders for this visit:    Encounter for routine adult health examination without abnormal findings    Essential hypertension with goal blood pressure less than 140/90  -     lisinopril (PRINIVIL/ZESTRIL) 10 MG tablet; Take 1 tablet (10 mg) by mouth daily  Continue current medications.    Need for prophylactic vaccination and inoculation against influenza  -     FLU VACCINE, (RIV4) RECOMBINANT HA  , IM (FluBlok, egg free) [17149]- >18 YRS ([facility] recommended  50-64 YRS)  -     Vaccine Administration, Initial [21443]    Encounter for screening mammogram for breast cancer  -     MA Screening Digital Bilateral; Future    Screening for colon cancer  -     GASTROENTEROLOGY ADULT REF PROCEDURE ONLY [facility] [phone]; No Provider Preference    PLAN:   Patient needs to follow up in if no improvement,or sooner if worsening of symptoms or other symptoms develop.  FURTHER TESTING:       - mammogram  Refer for colon  Work on weight loss  Regular exercise  Will follow up and/or notify patient of  results via My Chart to determine further need for followup  Follow up office visit in one year for annual health maintenance exam, sooner PRN.    COUNSELING:  Reviewed preventive health counseling, as reflected in patient instructions  Special attention given to:        Regular exercise       Healthy diet/nutrition       Vision screening       Immunizations    Vaccinated for: Influenza             Colon cancer screening       Consider Hep C screening for patients born between 1945 and 1965       The 10-year ASCVD risk score (Zoila DOUGHERTY Jr., et al., 2013) is: 3.2%    Values used to calculate the score:      Age: 58 years      Sex: Female      Is Non- : No      Diabetic: No      Tobacco smoker: No      Systolic Blood Pressure: 132 mmHg      Is BP treated: Yes      HDL Cholesterol: 53 mg/dL      Total Cholesterol: 176 mg/dL    BP Readings from Last 1 Encounters:   12/07/18  "132/70     Estimated body mass index is 38.8 kg/(m^2) as calculated from the following:    Height as of this encounter: 5' 4.5\" (1.638 m).    Weight as of this encounter: 229 lb 9.6 oz (104.1 kg).      Weight management plan: Discussed healthy diet and exercise guidelines     reports that she has never smoked. She has never used smokeless tobacco.      Counseling Resources:  ATP IV Guidelines  Pooled Cohorts Equation Calculator  Breast Cancer Risk Calculator  FRAX Risk Assessment  ICSI Preventive Guidelines  Dietary Guidelines for Americans, 2010  USDA's MyPlate  ASA Prophylaxis  Lung CA Screening    LULI Jacobsen CNP  [facility]  "

## 2018-12-07 NOTE — PROGRESS NOTES

## 2018-12-07 NOTE — NURSING NOTE
"Chief Complaint   Patient presents with     Physical     AFE       Initial /70 (BP Location: Right arm, Patient Position: Sitting, Cuff Size: Adult Large)  Pulse 75  Temp 98.8  F (37.1  C) (Temporal)  Ht 5' 4.5\" (1.638 m)  Wt 229 lb 9.6 oz (104.1 kg)  SpO2 99%  Breastfeeding? No  BMI 38.8 kg/m2 Estimated body mass index is 38.8 kg/(m^2) as calculated from the following:    Height as of this encounter: 5' 4.5\" (1.638 m).    Weight as of this encounter: 229 lb 9.6 oz (104.1 kg).  Medication Reconciliation: complete      BRYAN Arsmtrong      "

## 2018-12-07 NOTE — MR AVS SNAPSHOT
After Visit Summary   12/7/2018    Susan Paulino    MRN: [id number]           Patient Information     Date Of Birth          1960        Visit Information        Provider Department      12/7/2018 1:30 PM Nova Azevedo APRN CNP [facility]        Today's Diagnoses     Encounter for routine adult health examination without abnormal findings    -  1    Essential hypertension with goal blood pressure less than 140/90        Need for prophylactic vaccination and inoculation against influenza        Encounter for screening mammogram for breast cancer        Screening for colon cancer          Care Instructions    PLAN:   1.   Symptomatic therapy suggested: Increase calcium to 1000mg and 1000iu Vit D  Continue current medications.  Start on Lotrimin cream for your feet   2.  Orders Placed This Encounter   Medications     lisinopril (PRINIVIL/ZESTRIL) 10 MG tablet     Sig: Take 1 tablet (10 mg) by mouth daily     Dispense:  90 tablet     Refill:  3     Orders Placed This Encounter   Procedures     MA Screening Digital Bilateral     FLU VACCINE, (RIV4) RECOMBINANT HA  , IM (FluBlok, egg free) [29860]- >18 YRS (FMG recommended  50-64 YRS)     Vaccine Administration, Initial [51217]     GASTROENTEROLOGY ADULT REF PROCEDURE ONLY [facility] [phone]; No Provider Preference       3. Patient needs to follow up in if no improvement,or sooner if worsening of symptoms or other symptoms develop.  FURTHER TESTING:       - mammogram  Refer for colon  Work on weight loss  Regular exercise  Will follow up and/or notify patient of  results via My Chart to determine further need for followup  Follow up office visit in one year for annual health maintenance exam, sooner PRN.    Preventive Health Recommendations  Female Ages 50 - 64    Yearly exam: See your health care provider every year in order to  o Review health changes.   o Discuss preventive care.    o Review your medicines  if your doctor has prescribed any.      Get a Pap test every three years (unless you have an abnormal result and your provider advises testing more often).    If you get Pap tests with HPV test, you only need to test every 5 years, unless you have an abnormal result.     You do not need a Pap test if your uterus was removed (hysterectomy) and you have not had cancer.    You should be tested each year for STDs (sexually transmitted diseases) if you're at risk.     Have a mammogram every 1 to 2 years.    Have a colonoscopy at age 50, or have a yearly FIT test (stool test). These exams screen for colon cancer.      Have a cholesterol test every 5 years, or more often if advised.    Have a diabetes test (fasting glucose) every three years. If you are at risk for diabetes, you should have this test more often.     If you are at risk for osteoporosis (brittle bone disease), think about having a bone density scan (DEXA).    Shots: Get a flu shot each year. Get a tetanus shot every 10 years.    Nutrition:     Eat at least 5 servings of fruits and vegetables each day.    Eat whole-grain bread, whole-wheat pasta and brown rice instead of white grains and rice.    Get adequate Calcium and Vitamin D.     Lifestyle    Exercise at least 150 minutes a week (30 minutes a day, 5 days a week). This will help you control your weight and prevent disease.    Limit alcohol to one drink per day.    No smoking.     Wear sunscreen to prevent skin cancer.     See your dentist every six months for an exam and cleaning.    See your eye doctor every 1 to 2 years.  It was a pleasure seeing you today at the [facility]. Thank you for allowing us to care for you today. We truly hope we provided you with the excellent service you deserve. Please let us know if there is anything else we can do for you so we can be sure you are leaving completley satisfied with your care experience.       General information about your  clinic   Clinic Hours Lab Hours (Appointments are required)   Mon-Thurs: 7:30 AM - 7 PM Mon-Thurs: 7:30 AM - 7 PM   Fri: 7:30 AM - 5 PM Fri: 7:30 AM - 5 PM        After Hours Nurse Advise & Appts:  [facility]: [phone]  [facility] On Call: to make appointments anytime: [phone] On Call Physician: call [phone] and answering service will page the on call physician.        For urgent appointments, please call [phone] and ask for the triage nurse or your care team clinic nurse.  How to contact my care team:  MyChart: [email or web address]   Phone: [phone]   Fax: [phone]       [facility]:   Phone: [phone]  Hours: 8:00 AM - 6:00 PM  Medication Refills:  Call your pharmacy and they will forward the refill to us. Please allow 3 business days for your refills to be completed.       Normal or non-critical lab and imaging results will be communicated to you by MyChart, letter or phone within 7 days.  If you do not hear from us within 10 days, please call the clinic. If you have a critical or abnormal lab result, we will notify you by phone as soon as possible.       We now have PWIC (Pediatric Walk in Care)  Monday-Friday from 7:30-4. Simply walk in and be seen for your urgent needs like cough, fever, rash, diarrhea or vomiting, pink eye, UTI. No appointments needed. Ask one of the team for more information      -Your Care Team:    Dr. Hernán Terrell - Internal Medicine/Pediatrics   Dr. Suresh Cates - Family Medicine  Dr. Lesli Pelletier - Pediatrics  Dr. Judi Herman - Pediatrics  Nova Azevedo CNP - Family Practice Nurse Practitioner                 Athlete s Foot    Athlete s foot (tinea pedis) is caused by a fungal infection in the skin. It affects the skin between the toes, causing cracks in the skin called fissures. It can also affect the bottom of the foot where it causes dry white scales and peeling of the skin. This infection is more likely to occur when the foot is  in hot, sweaty socks and shoes for long periods of time. You may feel itching and burning between your toes. This infection is treated with skin creams or medicine taken by mouth.  Home care  The following are general care guidelines:    It is important to keep the feet dry. Use absorbent cotton socks and change them if they become sweaty. Or wear an open-toe shoe or sandal. Wash the feet at least once a day with soap and water.    Apply the antifungal cream as prescribed. Some antifungal creams are available without a prescription.    It may take a week before the rash starts to improve. It can take about 3 to 4 weeks to completely clear. Continue the medicine until the rash is all gone.    Use over-the-counter antifungal powders or sprays on your feet after exposure to high-risk environments, such as public showers, gyms, and locker rooms. This can help prevent future infections. Wearing appropriate shoes in these situations can help.  Prevention  The following tips may help prevent athlete s foot:    Don't share shoes or socks with someone who has athlete's foot.    Don't walk barefoot in places where a fungal infection can spread quickly such as locker rooms, showers, and swimming pools.    Change socks regularly.    Alternate shoes to assist in drying.  Follow-up care  Follow up with your healthcare provider as recommended if the rash does not improve after 10 days of treatment, or if the rash continues to spread.  When to seek medical care  Get medical attention right away if any of the following occur:    Fever of 100.4 F (38 C) or higher, or as directed    Increasing redness or swelling of the foot    Infection comes back soon after treatment    Pus draining from cracks in the skin  Date Last Reviewed: 8/1/2016 2000-2018 [employer]. [address] [place] [address], [place] [address]. All rights reserved. This information is not intended as a substitute for professional medical care. Always follow  your healthcare professional's instructions.                Follow-ups after your visit        Additional Services     GASTROENTEROLOGY ADULT REF PROCEDURE ONLY [facility] [phone]; No Provider Preference                 Future tests that were ordered for you today     Open Future Orders        Priority Expected Expires Ordered    MA Screening Digital Bilateral Routine  12/7/2019 12/7/2018            Who to contact     If you have questions or need follow up information about today's clinic visit or your schedule please contact [facility] directly at [phone].  Normal or non-critical lab and imaging results will be communicated to you by [employer], letter or phone within 4 business days after the clinic has received the results. If you do not hear from us within 7 days, please contact the clinic through [employer] or phone. If you have a critical or abnormal lab result, we will notify you by phone as soon as possible.  Submit refill requests through [employer] or call your pharmacy and they will forward the refill request to us. Please allow 3 business days for your refill to be completed.          Additional Information About Your Visit        MyChart Information     [employer] gives you secure access to your electronic health record. If you see a primary care provider, you can also send messages to your care team and make appointments. If you have questions, please call your primary care clinic.  If you do not have a primary care provider, please call [phone] and they will assist you.      [employer] is an electronic gateway that provides easy, online access to your medical records. With [employer], you can request a clinic appointment, read your test results, renew a prescription or communicate with your care team.     To access your existing account, please contact your [facility] or call [phone] for assistance.        Care EveryWhere ID     This is your  "Care EveryWhere ID. This could be used by other organizations to access your [facility] medical records  [id number]        Your Vitals Were     Pulse Temperature Height Pulse Oximetry Breastfeeding? BMI (Body Mass Index)    75 98.8  F (37.1  C) (Temporal) 5' 4.5\" (1.638 m) 99% No 38.8 kg/m2       Blood Pressure from Last 3 Encounters:   12/07/18 132/70   06/27/17 115/70   06/23/16 115/70    Weight from Last 3 Encounters:   12/07/18 229 lb 9.6 oz (104.1 kg)   06/27/17 228 lb (103.4 kg)   06/23/16 226 lb 12.8 oz (102.9 kg)              We Performed the Following     FLU VACCINE, (RIV4) RECOMBINANT HA  , IM (FluBlok, egg free) [10953]- >18 YRS (FMG recommended  50-64 YRS)     GASTROENTEROLOGY ADULT REF PROCEDURE ONLY [facility] [phone]; No Provider Preference     Vaccine Administration, Initial [07120]          Today's Medication Changes          These changes are accurate as of 12/7/18  1:55 PM.  If you have any questions, ask your nurse or doctor.               These medicines have changed or have updated prescriptions.        Dose/Directions    lisinopril 10 MG tablet   Commonly known as:  PRINIVIL/ZESTRIL   This may have changed:  See the new instructions.   Used for:  Essential hypertension with goal blood pressure less than 140/90   Changed by:  Nova Azevedo APRN CNP        Dose:  10 mg   Take 1 tablet (10 mg) by mouth daily   Quantity:  90 tablet   Refills:  3            Where to get your medicines      These medications were sent to [facility] [address] [place] [facility] [address] [facility]  [address] [facility] [address]     Phone:  [phone]     lisinopril 10 MG tablet                Primary Care Provider Office Phone # Fax #    LULI Jacobsen [phone]       [address]  [place] [address]        Equal Access to Services     CELY ROSA AH: Hadii aad ku hadasho Soomaali, waaxda luqadaha, qaybta kaalmada surekha, ej " steve walkerabbie carolineradha fergusonaan ah. So [facility] [phone].    ATENCIÓN: Si habla garrick, tiene a brody disposición servicios gratuitos de asistencia lingüística. Debbie al [phone].    We comply with applicable federal civil rights laws and [place] laws. We do not discriminate on the basis of race, color, national origin, age, disability, sex, sexual orientation, or gender identity.            Thank you!     Thank you for choosing [facility]  for your care. Our goal is always to provide you with excellent care. Hearing back from our patients is one way we can continue to improve our services. Please take a few minutes to complete the written survey that you may receive in the mail after your visit with us. Thank you!             Your Updated Medication List - Protect others around you: Learn how to safely use, store and throw away your medicines at www.disposemymeds.org.          This list is accurate as of 12/7/18  1:55 PM.  Always use your most recent med list.                   Brand Name Dispense Instructions for use Diagnosis    lisinopril 10 MG tablet    PRINIVIL/ZESTRIL    90 tablet    Take 1 tablet (10 mg) by mouth daily    Essential hypertension with goal blood pressure less than 140/90       MULTIVITAMIN ADULT PO      Take by mouth daily

## 2019-04-17 ENCOUNTER — MYC MEDICAL ADVICE (OUTPATIENT)
Dept: PEDIATRICS | Facility: CLINIC | Age: 59
End: 2019-04-17

## 2019-04-17 NOTE — TELEPHONE ENCOUNTER
Denies other symptoms or specific injury.   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  Muscle spasms occur on her right side, two times a day, often later in the day. She is able to sleep on that side.  Scheduled 4/19.  Astrid Che RN

## 2019-04-19 ENCOUNTER — ANCILLARY PROCEDURE (OUTPATIENT)
Dept: GENERAL RADIOLOGY | Facility: CLINIC | Age: 59
End: 2019-04-19
Attending: NURSE PRACTITIONER
Payer: COMMERCIAL

## 2019-04-19 ENCOUNTER — OFFICE VISIT (OUTPATIENT)
Dept: PEDIATRICS | Facility: CLINIC | Age: 59
End: 2019-04-19
Payer: COMMERCIAL

## 2019-04-19 VITALS
TEMPERATURE: 97.4 F | BODY MASS INDEX: 38.74 KG/M2 | SYSTOLIC BLOOD PRESSURE: 120 MMHG | DIASTOLIC BLOOD PRESSURE: 65 MMHG | WEIGHT: 232.5 LBS | HEIGHT: 65 IN | HEART RATE: 62 BPM | OXYGEN SATURATION: 97 %

## 2019-04-19 DIAGNOSIS — R07.81 PLEURITIC PAIN: Primary | ICD-10-CM

## 2019-04-19 DIAGNOSIS — R07.81 PLEURITIC PAIN: ICD-10-CM

## 2019-04-19 DIAGNOSIS — M62.830 SPASM OF THORACIC BACK MUSCLE: ICD-10-CM

## 2019-04-19 PROCEDURE — 71046 X-RAY EXAM CHEST 2 VIEWS: CPT | Performed by: RADIOLOGY

## 2019-04-19 PROCEDURE — 99214 OFFICE O/P EST MOD 30 MIN: CPT | Performed by: NURSE PRACTITIONER

## 2019-04-19 RX ORDER — CYCLOBENZAPRINE HCL 10 MG
10 TABLET ORAL 3 TIMES DAILY PRN
Qty: 30 TABLET | Refills: 1 | Status: SHIPPED | OUTPATIENT
Start: 2019-04-19 | End: 2021-11-23

## 2019-04-19 RX ORDER — NAPROXEN 500 MG/1
500 TABLET ORAL 2 TIMES DAILY PRN
Qty: 30 TABLET | Refills: 1 | Status: SHIPPED | OUTPATIENT
Start: 2019-04-19 | End: 2021-11-23

## 2019-04-19 ASSESSMENT — MIFFLIN-ST. JEOR: SCORE: 1632.55

## 2019-04-19 NOTE — NURSING NOTE
"Chief Complaint   Patient presents with     Back Pain     right mid back x 1 month, no known injuries       Initial /65 (BP Location: Right arm, Patient Position: Sitting, Cuff Size: Adult Large)   Pulse 62   Temp 97.4  F (36.3  C) (Temporal)   Ht 1.638 m (5' 4.5\")   Wt 105.5 kg (232 lb 8 oz)   SpO2 97%   Breastfeeding? No   BMI 39.29 kg/m   Estimated body mass index is 39.29 kg/m  as calculated from the following:    Height as of this encounter: 1.638 m (5' 4.5\").    Weight as of this encounter: 105.5 kg (232 lb 8 oz).  Medication Reconciliation: complete      BRYAN Armstrong      "

## 2019-04-19 NOTE — RESULT ENCOUNTER NOTE
Guero Paulino,    Attached are your test results.  -Chest xray was normal   Please contact us if you have any questions.    Nova Azevedo, CNP    
n/a

## 2019-04-19 NOTE — PROGRESS NOTES
SUBJECTIVE:   Susan Paulino is a 58 year old female who presents to clinic today for the following   health issues:    Back Pain       Duration: 1 month        Specific cause: none    Description:   Location of pain: middle of back right  Character of pain: sharp, occasionally  Pain radiation:none  New numbness or weakness in legs, not attributed to pain:  no     Intensity: Currently 0/10    History:   Pain interferes with job: YES, has a sit down job  History of back problems: no prior back problems  Any previous MRI or X-rays: None  Sees a specialist for back pain:  No  Therapies tried without relief: acetaminophen (Tylenol)    Alleviating factors:   Improved by: none      Precipitating factors:  Worsened by: Lifting, Bending and Sitting    BRYAN Armstrong        Accompanying Signs & Symptoms:  Risk of Fracture:  None  Risk of Cauda Equina:  None  Risk of Infection:  None  Risk of Cancer:  None  Risk of Ankylosing Spondylitis:  Onset at age <35, male, AND morning back stiffness. no       Additional history: as documented    Reviewed  and updated as needed this visit by clinical staff  Tobacco  Allergies  Meds  Med Hx  Surg Hx  Fam Hx  Soc Hx        Reviewed and updated as needed this visit by Provider         Patient Active Problem List   Diagnosis     Essential hypertension with goal blood pressure less than 140/90     CARDIOVASCULAR SCREENING; LDL GOAL LESS THAN 130     Family history of skin cancer     Past Surgical History:   Procedure Laterality Date     DRAIN PILONIDAL CYST SIMPL  1981     ORTHOPEDIC SURGERY      Right knee arthroscopic twice to clean meniscus     XR KNEE ARTHROGRAM RIGHT         Social History     Tobacco Use     Smoking status: Never Smoker     Smokeless tobacco: Never Used   Substance Use Topics     Alcohol use: Yes     Alcohol/week: 0.0 oz     Comment: 2     Family History   Problem Relation Age of Onset     Pancreatic Cancer Mother      Prostate Cancer Father       "Hyperlipidemia Father      Diabetes Maternal Grandmother      Parkinsonism Sister      Breast Cancer Sister      Thyroid Disease Sister      Diabetes Maternal Aunt      Colon Cancer Maternal Uncle      Thyroid Disease Sister      Other Cancer Brother      Pneumonia Brother      Parkinsonism Sister      Coronary Artery Disease No family hx of      Hypertension No family hx of      Cerebrovascular Disease No family hx of      Depression No family hx of      Anxiety Disorder No family hx of      Asthma No family hx of      Genetic Disorder No family hx of          Current Outpatient Medications   Medication Sig Dispense Refill     lisinopril (PRINIVIL/ZESTRIL) 10 MG tablet Take 1 tablet (10 mg) by mouth daily 90 tablet 3     Multiple Vitamins-Minerals (MULTIVITAMIN ADULT PO) Take by mouth daily       No Known Allergies  BP Readings from Last 3 Encounters:   04/19/19 120/65   12/07/18 132/70   06/27/17 115/70    Wt Readings from Last 3 Encounters:   04/19/19 105.5 kg (232 lb 8 oz)   12/07/18 104.1 kg (229 lb 9.6 oz)   06/27/17 103.4 kg (228 lb)                  Labs reviewed in EPIC    ROS:  Constitutional, HEENT, cardiovascular, pulmonary, gi and gu systems are negative, except as otherwise noted.    OBJECTIVE:     /65 (BP Location: Right arm, Patient Position: Sitting, Cuff Size: Adult Large)   Pulse 62   Temp 97.4  F (36.3  C) (Temporal)   Ht 1.638 m (5' 4.5\")   Wt 105.5 kg (232 lb 8 oz)   SpO2 97%   Breastfeeding? No   BMI 39.29 kg/m    Body mass index is 39.29 kg/m .  GENERAL APPEARANCE: {:50}  NECK: {:292377}  RESP: {:590461}  CV: {:040448}  MS: {:971308}  SKIN: {:016348}  NEURO: {:778579}  PSYCH: {:246288}    {Diagnostic Test Results:176850::\"Diagnostic Test Results:\",\"none \"}    ASSESSMENT/PLAN:     Susan was seen today for back pain.    Diagnoses and all orders for this visit:    Pleuritic pain  -     XR Chest 2 Views; Future    Spasm of thoracic back muscle right   -     naproxen (NAPROSYN) 500 " MG tablet; Take 1 tablet (500 mg) by mouth 2 times daily as needed  -     cyclobenzaprine (FLEXERIL) 10 MG tablet; Take 1 tablet (10 mg) by mouth 3 times daily as needed for muscle spasms        See Patient Instructions  There are no Patient Instructions on file for this visit.    LULI Jacobsen CNP  [facility]

## 2019-04-19 NOTE — PATIENT INSTRUCTIONS
PLAN:   1.   Symptomatic therapy suggested:   apply heat to affected area, apply ice to affected area, prescription for muscle relaxant, prescription for NSAID given    2.  Orders Placed This Encounter   Medications     naproxen (NAPROSYN) 500 MG tablet     Sig: Take 1 tablet (500 mg) by mouth 2 times daily as needed     Dispense:  30 tablet     Refill:  1     cyclobenzaprine (FLEXERIL) 10 MG tablet     Sig: Take 1 tablet (10 mg) by mouth 3 times daily as needed for muscle spasms     Dispense:  30 tablet     Refill:  1     Orders Placed This Encounter   Procedures     XR Chest 2 Views       3. Patient needs to follow up in if no improvement,or sooner if worsening of symptoms or other symptoms develop.  Chest xray   Will follow up and/or notify patient of  results via My Chart to determine further need for followup

## 2019-04-19 NOTE — PROGRESS NOTES
SUBJECTIVE:   Susan Paulino is a 58 year old female who presents to clinic today for the following   health issues:    Back Pain       Duration: 1 month        Specific cause: none    Description:   Location of pain: middle of back right  Character of pain: sharp, occasionally  Pain radiation:none  New numbness or weakness in legs, not attributed to pain:  no     Intensity: Currently 0/10    History:   Pain interferes with job: YES, has a sit down job  History of back problems: no prior back problems  Any previous MRI or X-rays: None  Sees a specialist for back pain:  No  Therapies tried without relief: acetaminophen (Tylenol)    Alleviating factors:   Improved by: none      Precipitating factors:  Worsened by: Lifting, Bending and Sitting    BRYAN Armstrong    Pain started about 4 weeks ago   Pain is upper right side over the ribs   No fever chills  No urinary symptoms  Coughing aggravates it and standing and walking aggravate it   Has tried occasional tylenol and not really helpful  The pain will come and go   Seemed to more frequent a few weeks   Does remember raking this last Tuesday and that definitely aggravated it   No cough or SOB but painful to take a deep breath at times   No Nausea or vomiting   No rashes     Accompanying Signs & Symptoms:  Risk of Fracture:  None  Risk of Cauda Equina:  None  Risk of Infection:  None  Risk of Cancer:  None  Risk of Ankylosing Spondylitis:  Onset at age <35, male, AND morning back stiffness. no       Additional history: as documented    Reviewed  and updated as needed this visit by clinical staff  Tobacco  Allergies  Meds  Med Hx  Surg Hx  Fam Hx  Soc Hx        Reviewed and updated as needed this visit by Provider         Patient Active Problem List   Diagnosis     Essential hypertension with goal blood pressure less than 140/90     CARDIOVASCULAR SCREENING; LDL GOAL LESS THAN 130     Family history of skin cancer     Past Surgical History:   Procedure  "Laterality Date     DRAIN PILONIDAL CYST SIMPL  1981     ORTHOPEDIC SURGERY      Right knee arthroscopic twice to clean meniscus     XR KNEE ARTHROGRAM RIGHT         Social History     Tobacco Use     Smoking status: Never Smoker     Smokeless tobacco: Never Used   Substance Use Topics     Alcohol use: Yes     Alcohol/week: 0.0 oz     Comment: 2     Family History   Problem Relation Age of Onset     Pancreatic Cancer Mother      Prostate Cancer Father      Hyperlipidemia Father      Diabetes Maternal Grandmother      Parkinsonism Sister      Breast Cancer Sister      Thyroid Disease Sister      Diabetes Maternal Aunt      Colon Cancer Maternal Uncle      Thyroid Disease Sister      Other Cancer Brother      Pneumonia Brother      Parkinsonism Sister      Coronary Artery Disease No family hx of      Hypertension No family hx of      Cerebrovascular Disease No family hx of      Depression No family hx of      Anxiety Disorder No family hx of      Asthma No family hx of      Genetic Disorder No family hx of          Current Outpatient Medications   Medication Sig Dispense Refill     lisinopril (PRINIVIL/ZESTRIL) 10 MG tablet Take 1 tablet (10 mg) by mouth daily 90 tablet 3     Multiple Vitamins-Minerals (MULTIVITAMIN ADULT PO) Take by mouth daily       No Known Allergies  BP Readings from Last 3 Encounters:   04/19/19 120/65   12/07/18 132/70   06/27/17 115/70    Wt Readings from Last 3 Encounters:   04/19/19 105.5 kg (232 lb 8 oz)   12/07/18 104.1 kg (229 lb 9.6 oz)   06/27/17 103.4 kg (228 lb)            Labs reviewed in EPIC    ROS:  Constitutional, HEENT, cardiovascular, pulmonary, gi and gu systems are negative, except as otherwise noted.    OBJECTIVE:     /65 (BP Location: Right arm, Patient Position: Sitting, Cuff Size: Adult Large)   Pulse 62   Temp 97.4  F (36.3  C) (Temporal)   Ht 1.638 m (5' 4.5\")   Wt 105.5 kg (232 lb 8 oz)   SpO2 97%   Breastfeeding? No   BMI 39.29 kg/m    Body mass index is " 39.29 kg/m .  GENERAL APPEARANCE: alert, active and no distress  NECK: normal, supple and no adenopathy  RESP: lungs clear to auscultation - no rales, rhonchi or wheezes  CV: regular rates and rhythm, no murmur, click or rub and no irregular beats  MS: extremities normal- no gross deformities noted  Tender:  left parathoracic muscles, right parathoracic muscles  Non-tender:  thoracic spinous processes, lumbar spinous processes  Range of Motion:  left thoracic rotation  full, painful, right thoracic rotation  full, painful  Strength:  Normal   SKIN: no suspicious lesions or rashes  NEURO: Normal strength and tone, mentation intact and speech normal  PSYCH: mentation appears normal and affect normal/bright    Diagnostic Test Results:  Recent Results (from the past 744 hour(s))   XR Chest 2 Views    Narrative    XR CHEST 2 VW  4/19/2019 9:01 AM      HISTORY: Pleuritic pain    COMPARISON: None    FINDINGS: PA and lateral views of the chest. Calcified granuloma in  the left upper lobe. No acute airspace opacities. No pneumothorax or  pleural effusion. Heart size is normal.      Impression    IMPRESSION: No acute cardiopulmonary findings.    I have personally reviewed the examination and initial interpretation  and I agree with the findings.    ANGELES CUI MD         ASSESSMENT/PLAN:     Susan was seen today for back pain.    Diagnoses and all orders for this visit:    Pleuritic pain  -     XR Chest 2 Views; Future    Spasm of thoracic back muscle right   -     naproxen (NAPROSYN) 500 MG tablet; Take 1 tablet (500 mg) by mouth 2 times daily as needed  -     cyclobenzaprine (FLEXERIL) 10 MG tablet; Take 1 tablet (10 mg) by mouth 3 times daily as needed for muscle spasms        See Patient Instructions  Patient Instructions     PLAN:   1.   Symptomatic therapy suggested:   apply heat to affected area, apply ice to affected area, prescription for muscle relaxant, prescription for NSAID given    2.  Orders Placed This  Encounter   Medications     naproxen (NAPROSYN) 500 MG tablet     Sig: Take 1 tablet (500 mg) by mouth 2 times daily as needed     Dispense:  30 tablet     Refill:  1     cyclobenzaprine (FLEXERIL) 10 MG tablet     Sig: Take 1 tablet (10 mg) by mouth 3 times daily as needed for muscle spasms     Dispense:  30 tablet     Refill:  1     Orders Placed This Encounter   Procedures     XR Chest 2 Views       3. Patient needs to follow up in if no improvement,or sooner if worsening of symptoms or other symptoms develop.  Chest xray   Will follow up and/or notify patient of  results via My Chart to determine further need for followup      LULI Jacobsen CNP  [facility]

## 2019-11-21 DIAGNOSIS — I10 ESSENTIAL HYPERTENSION WITH GOAL BLOOD PRESSURE LESS THAN 140/90: ICD-10-CM

## 2019-11-21 RX ORDER — LISINOPRIL 10 MG/1
TABLET ORAL
Qty: 90 TABLET | Refills: 0 | Status: SHIPPED | OUTPATIENT
Start: 2019-11-21 | End: 2021-11-23

## 2019-11-21 NOTE — LETTER
November 21, 2019      Susan Paulino  [address] [place] [address]              Dear Susan,      We recently received a refill request from your pharmacy requesting a refill of : Lisinopril.    A review of your chart indicates that your last office visit was on 4/19.  You are due for a physical with your provider with fasting labs. We have authorized a refill of your medication to allow time for you to schedule.     Please call the clinic at [phone], or log in to your [email or web address] account at [email or web address] to schedule your appointment within that time frame so there is no delay in next month's refill.    Thank you for taking an active role in your healthcare.    Sincerely,        Nova Azevedo CNP    If you need assistance with your [employer] log in, please call [phone].

## 2020-03-10 ENCOUNTER — HEALTH MAINTENANCE LETTER (OUTPATIENT)
Age: 60
End: 2020-03-10

## 2020-12-27 ENCOUNTER — HEALTH MAINTENANCE LETTER (OUTPATIENT)
Age: 60
End: 2020-12-27

## 2021-03-26 ENCOUNTER — IMMUNIZATION (OUTPATIENT)
Dept: PEDIATRICS | Facility: CLINIC | Age: 61
End: 2021-03-26
Payer: COMMERCIAL

## 2021-03-26 PROCEDURE — 91300 PR COVID VAC PFIZER DIL RECON 30 MCG/0.3 ML IM: CPT

## 2021-03-26 PROCEDURE — 0001A PR COVID VAC PFIZER DIL RECON 30 MCG/0.3 ML IM: CPT

## 2021-04-16 ENCOUNTER — IMMUNIZATION (OUTPATIENT)
Dept: PEDIATRICS | Facility: CLINIC | Age: 61
End: 2021-04-16
Attending: INTERNAL MEDICINE
Payer: COMMERCIAL

## 2021-04-16 PROCEDURE — 91300 PR COVID VAC PFIZER DIL RECON 30 MCG/0.3 ML IM: CPT

## 2021-04-16 PROCEDURE — 0002A PR COVID VAC PFIZER DIL RECON 30 MCG/0.3 ML IM: CPT

## 2021-04-24 ENCOUNTER — HEALTH MAINTENANCE LETTER (OUTPATIENT)
Age: 61
End: 2021-04-24

## 2021-10-09 ENCOUNTER — HEALTH MAINTENANCE LETTER (OUTPATIENT)
Age: 61
End: 2021-10-09

## 2021-11-20 NOTE — PROGRESS NOTES
SUBJECTIVE:   CC: Susan Paulino is an 61 year old woman who presents for preventive health visit.     Patient has been advised of split billing requirements and indicates understanding: Yes  Healthy Habits:    Getting at least 3 servings of Calcium per day:  Yes    Bi-annual eye exam:  NO    Dental care twice a year:  Yes    Sleep apnea or symptoms of sleep apnea:  None    Diet:  Regular (no restrictions)    Frequency of exercise:  2-3 days/week    Duration of exercise:  15-30 minutes    Taking medications regularly:  Yes    Medication side effects:  None    PHQ-2 Total Score:    Additional concerns today:  Yes      -derm referral for rash behind left leg, last 2 year, becoming worse, itchy, raised, red  -mole check      Today's PHQ-2 Score:   PHQ-2 ( 1999 [employer]) 11/23/2021   Q1: Little interest or pleasure in doing things 0   Q2: Feeling down, depressed or hopeless 0   PHQ-2 Score 0   PHQ-2 Total Score (12-17 Years)- Positive if 3 or more points; Administer PHQ-A if positive -   Q1: Little interest or pleasure in doing things Not at all   Q2: Feeling down, depressed or hopeless Not at all   PHQ-2 Score 0       Abuse: Current or Past (Physical, Sexual or Emotional) - No  Do you feel safe in your environment? Yes        Social History     Tobacco Use     Smoking status: Never Smoker     Smokeless tobacco: Never Used   Substance Use Topics     Alcohol use: Yes     Alcohol/week: 0.0 standard drinks     Comment: minimal/socially          Alcohol Use 11/23/2021   Prescreen: >3 drinks/day or >7 drinks/week? No   Prescreen: >3 drinks/day or >7 drinks/week? -       Reviewed orders with patient.  Reviewed health maintenance and updated orders accordingly - Yes  Lab work is in process  Labs reviewed in EPIC  BP Readings from Last 3 Encounters:   11/23/21 136/78   04/19/19 120/65   12/07/18 132/70    Wt Readings from Last 3 Encounters:   11/23/21 108.9 kg (240 lb)   04/19/19 105.5 kg (232 lb 8 oz)   12/07/18 104.1  kg (229 lb 9.6 oz)                  Patient Active Problem List   Diagnosis     Essential hypertension with goal blood pressure less than 140/90     CARDIOVASCULAR SCREENING; LDL GOAL LESS THAN 130     Family history of skin cancer     Past Surgical History:   Procedure Laterality Date     DRAIN PILONIDAL CYST SIMPL  1981     ORTHOPEDIC SURGERY      Right knee arthroscopic twice to clean meniscus     XR KNEE ARTHROGRAM RIGHT         Social History     Tobacco Use     Smoking status: Never Smoker     Smokeless tobacco: Never Used   Substance Use Topics     Alcohol use: Yes     Alcohol/week: 0.0 standard drinks     Comment: minimal/socially      Family History   Problem Relation Age of Onset     Pancreatic Cancer Mother      Prostate Cancer Father      Hyperlipidemia Father      Diabetes Maternal Grandmother      Parkinsonism Sister      Breast Cancer Sister      Thyroid Disease Sister      Diabetes Maternal Aunt      Colon Cancer Maternal Uncle      Thyroid Disease Sister      Other Cancer Brother      Pneumonia Brother      Parkinsonism Sister      Coronary Artery Disease No family hx of      Hypertension No family hx of      Cerebrovascular Disease No family hx of      Depression No family hx of      Anxiety Disorder No family hx of      Asthma No family hx of      Genetic Disorder No family hx of          Current Outpatient Medications   Medication Sig Dispense Refill     lisinopril (ZESTRIL) 10 MG tablet Take 1 tablet (10 mg) by mouth daily 90 tablet 3     Multiple Vitamins-Minerals (MULTIVITAMIN ADULT PO) Take by mouth daily       No Known Allergies    Breast Cancer Screening:  Any new diagnosis of family breast, ovarian, or bowel cancer? No    FHS-7:   Breast CA Risk Assessment (FHS-7) 11/23/2021   Did any of your first-degree relatives have breast or ovarian cancer? Yes   Did any of your relatives have bilateral breast cancer? Unknown   Did any man in your family have breast cancer? No   Did any woman in your  family have breast and ovarian cancer? Yes   Did any woman in your family have breast cancer before age 50 y? Yes   Do you have 2 or more relatives with breast and/or ovarian cancer? Yes   Do you have 2 or more relatives with breast and/or bowel cancer? Yes       Mammogram Screening: Recommended mammography every 1-2 years with patient discussion and risk factor consideration  Pertinent mammograms are reviewed under the imaging tab.    History of abnormal Pap smear: NO - age 30-65 PAP every 5 years with negative HPV co-testing recommended  PAP / HPV Latest Ref Rng & Units 11/23/2021 6/23/2016   PAP   Negative for Intraepithelial Lesion or Malignancy (NILM) -   PAP (Historical) - - NIL   HPV16 Negative Negative -   HPV18 Negative Negative -   HRHPV Negative Negative -     Reviewed and updated as needed this visit by clinical staff                Reviewed and updated as needed this visit by Provider               Past Medical History:   Diagnosis Date     Family history of skin cancer 6/27/2017     Family history of skin cancer      Hypertension       Past Surgical History:   Procedure Laterality Date     DRAIN PILONIDAL CYST SIMPL  1981     ORTHOPEDIC SURGERY      Right knee arthroscopic twice to clean meniscus     XR KNEE ARTHROGRAM RIGHT         Review of Systems   Constitutional: Negative for chills and fever.   HENT: Negative for congestion, ear pain, hearing loss and sore throat.    Eyes: Negative for pain and visual disturbance.   Respiratory: Negative for cough and shortness of breath.    Cardiovascular: Positive for peripheral edema. Negative for chest pain and palpitations.        Noticed towards ends of day    Gastrointestinal: Negative for abdominal pain, constipation, diarrhea, heartburn, hematochezia and nausea.   Breasts:  Negative for tenderness, breast mass and discharge.   Genitourinary: Negative for dysuria, frequency, genital sores, hematuria, pelvic pain, urgency, vaginal bleeding and vaginal  "discharge.   Musculoskeletal: Positive for arthralgias. Negative for joint swelling and myalgias.        In her hands occasionally and intermittent the 5 th finger intermittent pain at night does not need take anything for it    Skin: Positive for rash.   Neurological: Negative for dizziness, weakness, headaches and paresthesias.   Psychiatric/Behavioral: Negative for mood changes. The patient is not nervous/anxious.         OBJECTIVE:   /78   Pulse 64   Temp 98.2  F (36.8  C) (Tympanic)   Resp 16   Ht 1.613 m (5' 3.5\")   Wt 108.9 kg (240 lb)   SpO2 98%   BMI 41.85 kg/m    Physical Exam  GENERAL APPEARANCE: healthy, alert and no distress  EYES: Eyes grossly normal to inspection and conjunctivae and sclerae normal  HENT: ear canals and TM's normal, nose and mouth without ulcers or lesions, oropharynx clear and oral mucous membranes moist  NECK: no adenopathy, no asymmetry, masses, or scars and thyroid normal to palpation  RESP: lungs clear to auscultation - no rales, rhonchi or wheezes  BREAST: normal without masses, tenderness or nipple discharge and no palpable axillary masses or adenopathy  CV: regular rates and rhythm, no murmur, click or rub, no peripheral edema and peripheral pulses strong  ABDOMEN: soft, nontender, no hepatosplenomegaly, no masses and bowel sounds normal   (female): normal female external genitalia, normal urethral meatus, vaginal mucosal atrophy noted, normal cervix, adnexae, and uterus without masses or abnormal discharge  MS: no musculoskeletal defects are noted and gait is age appropriate without ataxia  SKIN:negatives: color normal, temperature normal, mobility and turgor normal, positives: atypical mole right upper arm   NEURO: Normal strength and tone, sensory exam grossly normal, mentation intact and speech normal  PSYCH: mentation appears normal and affect normal/bright    Diagnostic Test Results:  Labs reviewed in Epic  Results for orders placed or performed in visit " on 11/23/21   HPV High Risk Types DNA Cervical     Status: None   Result Value Ref Range    Other HR HPV Negative Negative    HPV16 DNA Negative Negative    HPV18 DNA Negative Negative    FINAL DIAGNOSIS       This patient's sample is negative for HPV DNA.        This test was developed and its performance characteristics determined by the [facility]. It has not been cleared or approved by the FDA. The laboratory is regulated under CLIA as qualified to perform high-complexity testing. This test is used for clinical purposes. It should not be regarded as investigational or for research.    METHODOLOGY: The Roche Violetta 4800 system uses automated extraction, simultaneous amplification of HPV (L1 region) and beta-globin, followed by real time detection of fluorescent labeled HPV and beta globin using specific oligonucleotide probes. The test specifically identified types HPV 16 DNA and HPV 18 DNA while concurrently detecting the rest of the high risk types (31, 33, 35, 39, 45, 51, 52, 56, 58, 59, 66 or 68).    COMMENTS: This test is not intended for use as a screening device for woman under age 30 with normal cervical cytology. Results should be correlated with cytologic and histologic findings. Close clinical followup is recommended.       Pap screen with HPV - recommended age 30 - 65 years     Status: None   Result Value Ref Range    Interpretation        Negative for Intraepithelial Lesion or Malignancy (NILM)    Comment         Papanicolaou Test Limitations:  Cervical cytology is a screening test with limited sensitivity, and regular screening is critical for cancer prevention.  Pap tests are primarily effective for the diagnosis/prevention of squamous cell carcinoma, not adenocarcinoma or other cancers.        Specimen Adequacy       Satisfactory for evaluation, endocervical/transformation zone component absent    Clinical Information        post-menopausal      HPV Reflex Yes regardless of result     Previous Abnormal?       No      Performing Labs       The technical component of this testing was completed at [facility]         ASSESSMENT/PLAN:   Susan was seen today for physical.    Diagnoses and all orders for this visit:    Routine general medical examination at a health care facility  -     REVIEW OF HEALTH MAINTENANCE PROTOCOL ORDERS    Essential hypertension with goal blood pressure less than 140/90  -     Comprehensive metabolic panel; Future  -     Albumin Random Urine Quantitative with Creat Ratio; Future  Restart:  -     lisinopril (ZESTRIL) 10 MG tablet; Take 1 tablet (10 mg) by mouth daily    CARDIOVASCULAR SCREENING; LDL GOAL LESS THAN 130  -     Lipid panel reflex to direct LDL Fasting; Future    Screening for thyroid disorder  -     TSH with free T4 reflex; Future    Screening for diabetes mellitus (DM)  -     Comprehensive metabolic panel; Future    Encounter for screening mammogram for breast cancer  -     *MA Screening Digital Bilateral; Future    Screening for malignant neoplasm of cervix  -     Pap screen with HPV - recommended age 30 - 65 years  -     HPV Hold (Lab Only)  -     HPV High Risk Types DNA Cervical    Screen for colon cancer  -     COLOGRAMAN(EXACT SCIENCES)    Dermatitis  -     Adult Dermatology Referral; Future    Atypical mole right upper arm   -     Adult Dermatology Referral; Future    Menopause ovarian failure  -     DX Hip/Pelvis/Spine; Future    Morbid obesity (H)  Healthy diet and exercise reviewed.  Limit pop and juice intake.  Risks of obesity discussed.  Encourage exercise.  Limit TV/Computer/game time to one hour a day.    PLAN:   1.   Symptomatic therapy suggested: restart lisinopril .  Increase calcium to 1000mg and 1000iu Vit D    2.  Orders Placed This Encounter   Medications     lisinopril (ZESTRIL) 10 MG tablet     Sig: Take 1 tablet (10  "mg) by mouth daily     Dispense:  90 tablet     Refill:  3     Orders Placed This Encounter   Procedures     REVIEW OF HEALTH MAINTENANCE PROTOCOL ORDERS     *MA Screening Digital Bilateral     DX Hip/Pelvis/Spine     Lipid panel reflex to direct LDL Fasting     Comprehensive metabolic panel     Albumin Random Urine Quantitative with Creat Ratio     TSH with free T4 reflex     CHASE(EXACT SCIENCES)     Adult Dermatology Referral       3. Patient needs to follow up in if no improvement,or sooner if worsening of symptoms or other symptoms develop.  FURTHER TESTING:       - DXA (bone density) scan       - mammogram  FUTURE LABS:       - Schedule a fasting blood draw   Will follow up and/or notify patient of  results via My Chart to determine further need for followup  Work on weight loss  Regular exercise  Follow up office visit in one year for annual health maintenance exam, sooner PRN.        Patient has been advised of split billing requirements and indicates understanding: Yes  COUNSELING:  Reviewed preventive health counseling, as reflected in patient instructions  Special attention given to:        Regular exercise       Healthy diet/nutrition       Vision screening       Osteoporosis prevention/bone health       Colon cancer screening       Consider Hep C screening for all patients one time for ages 18-79 years       The 10-year ASCVD risk score (Zoila LADONNA Jr., et al., 2013) is: 4.6%    Values used to calculate the score:      Age: 61 years      Sex: Female      Is Non- : No      Diabetic: No      Tobacco smoker: No      Systolic Blood Pressure: 136 mmHg      Is BP treated: Yes      HDL Cholesterol: 53 mg/dL      Total Cholesterol: 176 mg/dL       Advance Care Planning    Estimated body mass index is 39.29 kg/m  as calculated from the following:    Height as of 4/19/19: 1.638 m (5' 4.5\").    Weight as of 4/19/19: 105.5 kg (232 lb 8 oz).    Weight management plan: Discussed healthy diet " and exercise guidelines    She reports that she has never smoked. She has never used smokeless tobacco.      Counseling Resources:  ATP IV Guidelines  Pooled Cohorts Equation Calculator  Breast Cancer Risk Calculator  BRCA-Related Cancer Risk Assessment: FHS-7 Tool  FRAX Risk Assessment  ICSI Preventive Guidelines  Dietary Guidelines for Americans, 2010  USDA's MyPlate  ASA Prophylaxis  Lung CA Screening    LULI Jacobsen CNP  M [facility]

## 2021-11-20 NOTE — PATIENT INSTRUCTIONS
PLAN:   1.   Symptomatic therapy suggested: restart lisinopril .  Increase calcium to 1000mg and 1000iu Vit D    2.  Orders Placed This Encounter   Medications     lisinopril (ZESTRIL) 10 MG tablet     Sig: Take 1 tablet (10 mg) by mouth daily     Dispense:  90 tablet     Refill:  3     Orders Placed This Encounter   Procedures     REVIEW OF HEALTH MAINTENANCE PROTOCOL ORDERS     *MA Screening Digital Bilateral     DX Hip/Pelvis/Spine     Lipid panel reflex to direct LDL Fasting     Comprehensive metabolic panel     Albumin Random Urine Quantitative with Creat Ratio     TSH with free T4 reflex     COLOGUARD(EXACT SCIENCES)     Adult Dermatology Referral       3. Patient needs to follow up in if no improvement,or sooner if worsening of symptoms or other symptoms develop.  FURTHER TESTING:       - DXA (bone density) scan       - mammogram  FUTURE LABS:       - Schedule a fasting blood draw   Will follow up and/or notify patient of  results via My Chart to determine further need for followup  Work on weight loss  Regular exercise  Follow up office visit in one year for annual health maintenance exam, sooner PRN.    Preventive Health Recommendations  Female Ages 50 - 64    Yearly exam: See your health care provider every year in order to  o Review health changes.   o Discuss preventive care.    o Review your medicines if your doctor has prescribed any.      Get a Pap test every three years (unless you have an abnormal result and your provider advises testing more often).    If you get Pap tests with HPV test, you only need to test every 5 years, unless you have an abnormal result.     You do not need a Pap test if your uterus was removed (hysterectomy) and you have not had cancer.    You should be tested each year for STDs (sexually transmitted diseases) if you're at risk.     Have a mammogram every 1 to 2 years.    Have a colonoscopy at age 50, or have a yearly FIT test (stool test). These exams screen for colon  cancer.      Have a cholesterol test every 5 years, or more often if advised.    Have a diabetes test (fasting glucose) every three years. If you are at risk for diabetes, you should have this test more often.     If you are at risk for osteoporosis (brittle bone disease), think about having a bone density scan (DEXA).    Shots: Get a flu shot each year. Get a tetanus shot every 10 years.    Nutrition:     Eat at least 5 servings of fruits and vegetables each day.    Eat whole-grain bread, whole-wheat pasta and brown rice instead of white grains and rice.    Get adequate Calcium and Vitamin D.     Lifestyle    Exercise at least 150 minutes a week (30 minutes a day, 5 days a week). This will help you control your weight and prevent disease.    Limit alcohol to one drink per day.    No smoking.     Wear sunscreen to prevent skin cancer.     See your dentist every six months for an exam and cleaning.    See your eye doctor every 1 to 2 years.

## 2021-11-23 ENCOUNTER — OFFICE VISIT (OUTPATIENT)
Dept: FAMILY MEDICINE | Facility: CLINIC | Age: 61
End: 2021-11-23
Payer: COMMERCIAL

## 2021-11-23 VITALS
RESPIRATION RATE: 16 BRPM | SYSTOLIC BLOOD PRESSURE: 136 MMHG | OXYGEN SATURATION: 98 % | TEMPERATURE: 98.2 F | BODY MASS INDEX: 40.97 KG/M2 | HEIGHT: 64 IN | DIASTOLIC BLOOD PRESSURE: 78 MMHG | HEART RATE: 64 BPM | WEIGHT: 240 LBS

## 2021-11-23 DIAGNOSIS — Z12.31 ENCOUNTER FOR SCREENING MAMMOGRAM FOR BREAST CANCER: ICD-10-CM

## 2021-11-23 DIAGNOSIS — Z12.4 SCREENING FOR MALIGNANT NEOPLASM OF CERVIX: ICD-10-CM

## 2021-11-23 DIAGNOSIS — R79.89 ELEVATED TSH: ICD-10-CM

## 2021-11-23 DIAGNOSIS — Z00.00 ROUTINE GENERAL MEDICAL EXAMINATION AT A HEALTH CARE FACILITY: Primary | ICD-10-CM

## 2021-11-23 DIAGNOSIS — D22.9 ATYPICAL MOLE: ICD-10-CM

## 2021-11-23 DIAGNOSIS — Z12.11 SCREEN FOR COLON CANCER: ICD-10-CM

## 2021-11-23 DIAGNOSIS — I10 ESSENTIAL HYPERTENSION WITH GOAL BLOOD PRESSURE LESS THAN 140/90: ICD-10-CM

## 2021-11-23 DIAGNOSIS — E28.39 MENOPAUSE OVARIAN FAILURE: ICD-10-CM

## 2021-11-23 DIAGNOSIS — Z13.6 CARDIOVASCULAR SCREENING; LDL GOAL LESS THAN 130: ICD-10-CM

## 2021-11-23 DIAGNOSIS — Z13.1 SCREENING FOR DIABETES MELLITUS (DM): ICD-10-CM

## 2021-11-23 DIAGNOSIS — Z13.29 SCREENING FOR THYROID DISORDER: ICD-10-CM

## 2021-11-23 DIAGNOSIS — L30.9 DERMATITIS: ICD-10-CM

## 2021-11-23 DIAGNOSIS — E66.01 MORBID OBESITY (H): ICD-10-CM

## 2021-11-23 PROCEDURE — 99213 OFFICE O/P EST LOW 20 MIN: CPT | Mod: 25 | Performed by: NURSE PRACTITIONER

## 2021-11-23 PROCEDURE — G0145 SCR C/V CYTO,THINLAYER,RESCR: HCPCS | Performed by: NURSE PRACTITIONER

## 2021-11-23 PROCEDURE — 99396 PREV VISIT EST AGE 40-64: CPT | Performed by: NURSE PRACTITIONER

## 2021-11-23 PROCEDURE — 87624 HPV HI-RISK TYP POOLED RSLT: CPT | Performed by: NURSE PRACTITIONER

## 2021-11-23 RX ORDER — LISINOPRIL 10 MG/1
10 TABLET ORAL DAILY
Qty: 90 TABLET | Refills: 3 | Status: SHIPPED | OUTPATIENT
Start: 2021-11-23 | End: 2022-12-13

## 2021-11-23 ASSESSMENT — ENCOUNTER SYMPTOMS
WEAKNESS: 0
HEARTBURN: 0
BREAST MASS: 0
MYALGIAS: 0
ARTHRALGIAS: 1
DYSURIA: 0
HEMATURIA: 0
DIZZINESS: 0
CHILLS: 0
NERVOUS/ANXIOUS: 0
HEMATOCHEZIA: 0
COUGH: 0
FEVER: 0
SORE THROAT: 0
EYE PAIN: 0
CONSTIPATION: 0
SHORTNESS OF BREATH: 0
NAUSEA: 0
HEADACHES: 0
FREQUENCY: 0
PALPITATIONS: 0
PARESTHESIAS: 0
JOINT SWELLING: 0
ABDOMINAL PAIN: 0
DIARRHEA: 0

## 2021-11-23 ASSESSMENT — MIFFLIN-ST. JEOR: SCORE: 1635.69

## 2021-11-29 LAB
BKR LAB AP GYN ADEQUACY: NORMAL
BKR LAB AP GYN INTERPRETATION: NORMAL
BKR LAB AP HPV REFLEX: NORMAL
BKR LAB AP PREVIOUS ABNORMAL: NORMAL
PATH REPORT.COMMENTS IMP SPEC: NORMAL
PATH REPORT.COMMENTS IMP SPEC: NORMAL
PATH REPORT.RELEVANT HX SPEC: NORMAL

## 2021-11-30 PROBLEM — E66.01 MORBID OBESITY (H): Status: ACTIVE | Noted: 2021-11-30

## 2021-11-30 LAB
HUMAN PAPILLOMA VIRUS 16 DNA: NEGATIVE
HUMAN PAPILLOMA VIRUS 18 DNA: NEGATIVE
HUMAN PAPILLOMA VIRUS FINAL DIAGNOSIS: NORMAL
HUMAN PAPILLOMA VIRUS OTHER HR: NEGATIVE

## 2021-12-08 ENCOUNTER — ANCILLARY PROCEDURE (OUTPATIENT)
Dept: BONE DENSITY | Facility: CLINIC | Age: 61
End: 2021-12-08
Attending: NURSE PRACTITIONER
Payer: COMMERCIAL

## 2021-12-08 ENCOUNTER — LAB (OUTPATIENT)
Dept: LAB | Facility: CLINIC | Age: 61
End: 2021-12-08
Payer: COMMERCIAL

## 2021-12-08 DIAGNOSIS — M89.9 DISORDER OF BONE: ICD-10-CM

## 2021-12-08 DIAGNOSIS — Z13.6 CARDIOVASCULAR SCREENING; LDL GOAL LESS THAN 130: ICD-10-CM

## 2021-12-08 DIAGNOSIS — I10 ESSENTIAL HYPERTENSION WITH GOAL BLOOD PRESSURE LESS THAN 140/90: ICD-10-CM

## 2021-12-08 DIAGNOSIS — Z13.1 SCREENING FOR DIABETES MELLITUS (DM): ICD-10-CM

## 2021-12-08 DIAGNOSIS — Z13.29 SCREENING FOR THYROID DISORDER: ICD-10-CM

## 2021-12-08 DIAGNOSIS — E28.39 MENOPAUSE OVARIAN FAILURE: ICD-10-CM

## 2021-12-08 LAB
ALBUMIN SERPL-MCNC: 4.2 G/DL (ref 3.4–5)
ALP SERPL-CCNC: 91 U/L (ref 40–150)
ALT SERPL W P-5'-P-CCNC: 26 U/L (ref 0–50)
ANION GAP SERPL CALCULATED.3IONS-SCNC: 4 MMOL/L (ref 3–14)
AST SERPL W P-5'-P-CCNC: 16 U/L (ref 0–45)
BILIRUB SERPL-MCNC: 0.8 MG/DL (ref 0.2–1.3)
BUN SERPL-MCNC: 12 MG/DL (ref 7–30)
CALCIUM SERPL-MCNC: 9.4 MG/DL (ref 8.5–10.1)
CHLORIDE BLD-SCNC: 106 MMOL/L (ref 94–109)
CHOLEST SERPL-MCNC: 173 MG/DL
CO2 SERPL-SCNC: 29 MMOL/L (ref 20–32)
CREAT SERPL-MCNC: 0.8 MG/DL (ref 0.52–1.04)
CREAT UR-MCNC: 59 MG/DL
FASTING STATUS PATIENT QL REPORTED: YES
GFR SERPL CREATININE-BSD FRML MDRD: 80 ML/MIN/1.73M2
GLUCOSE BLD-MCNC: 94 MG/DL (ref 70–99)
HDLC SERPL-MCNC: 62 MG/DL
LDLC SERPL CALC-MCNC: 93 MG/DL
MICROALBUMIN UR-MCNC: 7 MG/L
MICROALBUMIN/CREAT UR: 11.86 MG/G CR (ref 0–25)
NONHDLC SERPL-MCNC: 111 MG/DL
POTASSIUM BLD-SCNC: 4.3 MMOL/L (ref 3.4–5.3)
PROT SERPL-MCNC: 8 G/DL (ref 6.8–8.8)
SODIUM SERPL-SCNC: 139 MMOL/L (ref 133–144)
T4 FREE SERPL-MCNC: 1.03 NG/DL (ref 0.76–1.46)
TRIGL SERPL-MCNC: 91 MG/DL
TSH SERPL DL<=0.005 MIU/L-ACNC: 4.33 MU/L (ref 0.4–4)

## 2021-12-08 PROCEDURE — 80061 LIPID PANEL: CPT

## 2021-12-08 PROCEDURE — 80053 COMPREHEN METABOLIC PANEL: CPT

## 2021-12-08 PROCEDURE — 77081 DXA BONE DENSITY APPENDICULR: CPT | Mod: 59 | Performed by: RADIOLOGY

## 2021-12-08 PROCEDURE — 84439 ASSAY OF FREE THYROXINE: CPT

## 2021-12-08 PROCEDURE — 77080 DXA BONE DENSITY AXIAL: CPT | Performed by: RADIOLOGY

## 2021-12-08 PROCEDURE — 36415 COLL VENOUS BLD VENIPUNCTURE: CPT

## 2021-12-08 PROCEDURE — 82043 UR ALBUMIN QUANTITATIVE: CPT

## 2021-12-08 PROCEDURE — 84443 ASSAY THYROID STIM HORMONE: CPT

## 2021-12-08 NOTE — RESULT ENCOUNTER NOTE
Guero Paulino,    Attached are your test results.  Your Dexa Bone Density test showed:    Normal results - Your bone mineral density is within normal limits.   Exercise and Calcium/Vitamin D supplements may be helpful to maintain this normal condition.     Please contact us if you have any questions.    Nova Azevedo, CNP

## 2021-12-09 NOTE — RESULT ENCOUNTER NOTE
uGero Paulino,    Attached are your test results.  -Cholesterol levels (LDL,HDL, Triglycerides) are normal.  ADVISE: rechecking in 1 year.   -Liver and gallbladder tests are normal (ALT,AST, Alk phos, bilirubin), kidney function is normal (Cr, GFR), sodium is normal, potassium is normal, calcium is normal, glucose is normal.  -TSH (thyroid stimulating hormone) level can indicate hypothyroidism (an underactive thyroid).  This can cause a number of symptoms including weight gain, fatigue, high cholesterol, constipation, hair loss, cold intolerance).  ADVISE:rechecking your TSH in 1 month.   -Microalbumin (urine protein) test is normal.  ADVISE: rechecking this annually.   Please contact us if you have any questions.    Nova Azevedo, CNP

## 2021-12-14 ENCOUNTER — ANCILLARY PROCEDURE (OUTPATIENT)
Dept: MAMMOGRAPHY | Facility: CLINIC | Age: 61
End: 2021-12-14
Attending: NURSE PRACTITIONER
Payer: COMMERCIAL

## 2021-12-14 DIAGNOSIS — Z12.31 VISIT FOR SCREENING MAMMOGRAM: ICD-10-CM

## 2021-12-14 PROCEDURE — 77063 BREAST TOMOSYNTHESIS BI: CPT | Mod: GC | Performed by: RADIOLOGY

## 2021-12-14 PROCEDURE — 77067 SCR MAMMO BI INCL CAD: CPT | Mod: GC | Performed by: RADIOLOGY

## 2022-01-26 ENCOUNTER — OFFICE VISIT (OUTPATIENT)
Dept: DERMATOLOGY | Facility: CLINIC | Age: 62
End: 2022-01-26
Attending: NURSE PRACTITIONER
Payer: COMMERCIAL

## 2022-01-26 DIAGNOSIS — L82.1 SEBORRHEIC KERATOSES: ICD-10-CM

## 2022-01-26 DIAGNOSIS — D18.01 CHERRY ANGIOMA: ICD-10-CM

## 2022-01-26 DIAGNOSIS — D22.9 ATYPICAL MOLE: ICD-10-CM

## 2022-01-26 DIAGNOSIS — L30.9 DERMATITIS: ICD-10-CM

## 2022-01-26 DIAGNOSIS — L57.0 AK (ACTINIC KERATOSIS): Primary | ICD-10-CM

## 2022-01-26 DIAGNOSIS — L81.4 LENTIGINES: ICD-10-CM

## 2022-01-26 DIAGNOSIS — D22.9 MULTIPLE BENIGN NEVI: ICD-10-CM

## 2022-01-26 PROCEDURE — 99203 OFFICE O/P NEW LOW 30 MIN: CPT | Mod: 25 | Performed by: PHYSICIAN ASSISTANT

## 2022-01-26 PROCEDURE — 17000 DESTRUCT PREMALG LESION: CPT | Performed by: PHYSICIAN ASSISTANT

## 2022-01-26 NOTE — PATIENT INSTRUCTIONS
Moisturizers:   Cetaphil or CeraVe cream in a jar at least once or twice daily            Sun protective clothing and Resources     Lands End (www.landsend.com)  Athleta (www.athleta.Free All Media)  Ludi (www.[email or web address] All Media)  Carve Designs ([employer] Luxury Club) - affordable  Skinz (Vertos Medicalskinz.com)    Long sleeve - Oz Cool DRI UPF 50 or [place] PFG UPF 50  Hoodie - [place] PFG UPF 50  Swimshirt/Rash Guard - Tawny UPF 50 (on Amazon)  Neck - Outdoor Research Ubertubes (www.outdoorresearch.com)

## 2022-01-26 NOTE — PROGRESS NOTES
[facility] Dermatology Note  Encounter Date: Jan 26, 2022  Office Visit     Dermatology Problem List:  1. AK - L mid cheek x1 - s/p cryo 1/26/22    Family History: BCC in brother  ____________________________________________    Assessment & Plan:  # Cherry angioma(s).    - No further intervention needed.    # Multiple clinically benign nevi on the trunk.    - No further intervention needed.     # Seborrheic keratosis, non irritated.   - No further intervention needed.     # Solar lentigines.   - No further intervention needed.     # Eczematous dermatitis L calf.  -OTC cortisone PRN for itching  -Cerave cream BID, more in winter    # Actinic keratosis. x1 - L mid cheek  - Cryotherapy performed today (see procedure note(s) below).     Procedures Performed:   - Cryotherapy procedure note, location(s): L mid cheek. After verbal consent and discussion of risks and benefits including, but not limited to, dyspigmentation/scar, blister, and pain, 1 lesion(s) was(were) treated with 1-2 mm freeze border for 1-2 cycles with liquid nitrogen. Post cryotherapy instructions were provided.    Follow-up: 1 year(s) in-person, or earlier for new or changing lesions    Staff   All risks, benefits and alternatives were discussed with patient.  Patient is in agreement and understands the assessment and plan.  All questions were answered.    Marycarmen Ponce PA-C, MPAS  [facility] [place]: Phone: [phone], Fax: [phone]  [facility]: Phone: [phone],  Fax: [phone]  ____________________________________________    CC: Skin Check (Full body skin check. Spot of concern on right upper arm, left calf, under left arm and face. Patient has not had a previous skin check. Family history of BCC in brother. )    HPI:  Ms. Susan Paulino is a(n) 61 year old female who presents today as a new patient for a skin check. Hx eczema on  hands 20 years ago. No personal hx of skin cancer, but had brother with BCC.     Referred by Nova Azevedo CNP, on 11/23/21 for a rash on the leg which sometimes gets itchy. Winter seems to be a little worse for the itch.     Today, she has a spot of concern on the right upper arm, left calf, left upper arm and face. Notes the spot on the face seems rough. She has also noted some increased discoloration on the face over the years. Does use sunscreen, but notes also that she spends a lot of time outside.     Patient is otherwise feeling well, without additional concerns.    Labs:  none    Physical Exam:  Vitals: There were no vitals taken for this visit.  SKIN: Full skin, which includes the head/face, both arms, chest, back, abdomen,both legs, genitalia and/or groin buttocks, digits and/or nails, was examined.  - Barrios Type I, less than 100  - There are dome shaped bright red papules on the trunk.   - Multiple regular brown pigmented macules and papules are identified on the trunk.   - Scattered brown macules on sun exposed areas.  - There are waxy stuck on tan to brown papules on the R arm and L flank.   - There is an erythematous macule with overyling adherent scale on the L cheek x1.  - L calf - erythematous scaly patch  - No other lesions of concern on areas examined.     Medications:  Current Outpatient Medications   Medication     lisinopril (ZESTRIL) 10 MG tablet     Multiple Vitamins-Minerals (MULTIVITAMIN ADULT PO)     No current facility-administered medications for this visit.      Past Medical History:   Patient Active Problem List   Diagnosis     Essential hypertension with goal blood pressure less than 140/90     CARDIOVASCULAR SCREENING; LDL GOAL LESS THAN 130     Family history of skin cancer     Morbid obesity (H)     Past Medical History:   Diagnosis Date     Family history of skin cancer 6/27/2017     Family history of skin cancer      Hypertension         CC LULI Jacobsen  CNP  [address] [place] [address] on close of this encounter.

## 2022-01-26 NOTE — LETTER
1/26/2022         RE: Susan Paulino  [address] [place] [address]        Dear Colleague,    Thank you for referring your patient, Susan Paulino, to the [facility]. Please see a copy of my visit note below.    [facility] Dermatology Note  Encounter Date: Jan 26, 2022  Office Visit     Dermatology Problem List:  1. AK - L mid cheek x1 - s/p cryo 1/26/22    Family History: BCC in brother  ____________________________________________    Assessment & Plan:  # Cherry angioma(s).    - No further intervention needed.    # Multiple clinically benign nevi on the trunk.    - No further intervention needed.     # Seborrheic keratosis, non irritated.   - No further intervention needed.     # Solar lentigines.   - No further intervention needed.     # Eczematous dermatitis L calf.  -OTC cortisone PRN for itching  -Cerave cream BID, more in winter    # Actinic keratosis. x1 - L mid cheek  - Cryotherapy performed today (see procedure note(s) below).     Procedures Performed:   - Cryotherapy procedure note, location(s): L mid cheek. After verbal consent and discussion of risks and benefits including, but not limited to, dyspigmentation/scar, blister, and pain, 1 lesion(s) was(were) treated with 1-2 mm freeze border for 1-2 cycles with liquid nitrogen. Post cryotherapy instructions were provided.    Follow-up: 1 year(s) in-person, or earlier for new or changing lesions    Staff   All risks, benefits and alternatives were discussed with patient.  Patient is in agreement and understands the assessment and plan.  All questions were answered.    Marycarmen Ponce PA-C, MPAS  [facility] [place]: Phone: [phone], Fax: [phone]  [facility]: Phone: [phone],  Fax: [phone]  ____________________________________________    CC: Skin Check (Full body skin check. Spot of  concern on right upper arm, left calf, under left arm and face. Patient has not had a previous skin check. Family history of BCC in brother. )    HPI:  Ms. Susan Paulino is a(n) 61 year old female who presents today as a new patient for a skin check. Hx eczema on hands 20 years ago. No personal hx of skin cancer, but had brother with BCC.     Referred by Nova Azevedo CNP, on 11/23/21 for a rash on the leg which sometimes gets itchy. Winter seems to be a little worse for the itch.     Today, she has a spot of concern on the right upper arm, left calf, left upper arm and face. Notes the spot on the face seems rough. She has also noted some increased discoloration on the face over the years. Does use sunscreen, but notes also that she spends a lot of time outside.     Patient is otherwise feeling well, without additional concerns.    Labs:  none    Physical Exam:  Vitals: There were no vitals taken for this visit.  SKIN: Full skin, which includes the head/face, both arms, chest, back, abdomen,both legs, genitalia and/or groin buttocks, digits and/or nails, was examined.  - Barrios Type I, less than 100  - There are dome shaped bright red papules on the trunk.   - Multiple regular brown pigmented macules and papules are identified on the trunk.   - Scattered brown macules on sun exposed areas.  - There are waxy stuck on tan to brown papules on the R arm and L flank.   - There is an erythematous macule with overyling adherent scale on the L cheek x1.  - L calf - erythematous scaly patch  - No other lesions of concern on areas examined.     Medications:  Current Outpatient Medications   Medication     lisinopril (ZESTRIL) 10 MG tablet     Multiple Vitamins-Minerals (MULTIVITAMIN ADULT PO)     No current facility-administered medications for this visit.      Past Medical History:   Patient Active Problem List   Diagnosis     Essential hypertension with goal blood pressure less than 140/90     CARDIOVASCULAR  SCREENING; LDL GOAL LESS THAN 130     Family history of skin cancer     Morbid obesity (H)     Past Medical History:   Diagnosis Date     Family history of skin cancer 6/27/2017     Family history of skin cancer      Hypertension         CC Nova Azevedo, APRN CNP  [address] [address] on close of this encounter.        Again, thank you for allowing me to participate in the care of your patient.        Sincerely,        Marycarmen Ponce PA-C

## 2022-01-26 NOTE — NURSING NOTE
Susan Paulino's goals for this visit include:   Chief Complaint   Patient presents with     Skin Check     Full body skin check. Spot of concern on right upper arm, left calf, under left arm and face. Patient has not had a previous skin check. Family history of BCC in brother.        She requests these members of her care team be copied on today's visit information:     PCP: Nova Azevedo    Referring Provider:  Nova Azevedo, LULI CNP  [address] [place] [address]    There were no vitals taken for this visit.    Do you need any medication refills at today's visit? No  Lexie Vergara, ALY

## 2022-01-31 ENCOUNTER — TELEPHONE (OUTPATIENT)
Dept: FAMILY MEDICINE | Facility: CLINIC | Age: 62
End: 2022-01-31
Payer: COMMERCIAL

## 2022-01-31 DIAGNOSIS — Z12.11 SCREEN FOR COLON CANCER: Primary | ICD-10-CM

## 2022-01-31 NOTE — TELEPHONE ENCOUNTER
Kenzie called to let us know a positive screen was faxed to us on 1/28/22. They can be contacted at [phone]

## 2022-02-04 ENCOUNTER — TELEPHONE (OUTPATIENT)
Dept: GASTROENTEROLOGY | Facility: CLINIC | Age: 62
End: 2022-02-04
Payer: COMMERCIAL

## 2022-02-04 NOTE — TELEPHONE ENCOUNTER
Screening Questions  Blue=prep questions Red=location Green=sedation   1. Are you active on mychart? Yes     2. What insurance is in the chart? [employer]     3.  Ordering/Referring Provider: Nova Azevedo APRN CNP     4. BMI 40.3, If greater than 40 review exclusion criteria also will need EXTENDED PREP    5.  Respiratory Screening (If yes to any of the following HOSPITAL setting only):     Do you use daily home oxygen? No   Do you have mod to severe Obstructive Sleep Apnea? No  (can be seen at [facility] or hospital setting)    Do you have Pulmonary Hypertension? No    Do you have UNCONTROLLED asthma? No     6. Have you had a heart or lung transplant? No   (If yes, please review exclusion criteria)    7. Are you currently on dialysis? no  (If yes, schedule in HOSPITAL setting only)(If yes, please send Golytely prep)    8. Do you have chronic kidney disease? no  (If yes, please send Golytely prep)    9. Have you had a stroke or Transient ischemic attack (TIA) within 6 months? No  (If yes, do not schedule at [facility])    10. In the past 6 months, have you had any heart related issues including cardiomyopathy or heart attack? No  (If yes, please review exclusion criteria)           If yes, did it require cardiac stenting or other implantable device?  (If yes, please review exclusion criteria)      11. Do you have any implantable devices in your body (pacemaker, defib, LVAD)? no  (If yes, schedule at [facility])    12. Do you take nitroglycerin? If yes, how often? No  (if yes, schedule at HOSPITAL setting)    13. Are you currently taking any blood thinners? No  (If yes- inform patient to follow up with PCP or provider for follow up instructions)     14. Are you a diabetic? No  (If yes, please send Golytely prep)    15. (Females) Are you currently pregnant?   If yes, how many weeks?      16. Are you taking any prescription pain medications on a routine schedule? No  If yes, MAC sedation and patient will need EXTENDED  PREP.    17. Do you have any chemical dependencies such as alcohol, street drugs, or methadone? No  If yes, MAC sedation     18. Do you have any history of post-traumatic stress syndrome, severe anxiety or history of psychosis? No   If yes, MAC sedation.     19. Do you transfer independently? Yes     20.  Do you have any issues with constipation? No    If yes, pt will need EXTENDED PREP     21. Preferred Pharmacy for Pre Prescription [facility] [address] [address]    Scheduling Details    Which Colonoscopy Prep was Sent?: Miralax  Type of Procedure Scheduled: colon  Surgeon: Ritesh  Date of Procedure: 4/22/2022  Location:   Caller (Please ask for phone number if not scheduled by patient): Susan Paulino      Sedation Type: CS  Conscious Sedation- Needs  for 6 hours after the procedure  MAC/General-Needs  for 24 hours after procedure    Pre-op Required at [facility] [place], [place] and OR for MAC sedation: no  (if yes advise patient they will need a pre-op prior to procedure)      Informed patient they will need an adult  yesh  Cannot take any type of public or medical transportation alone    Pre-Procedure Covid test to be completed at [facility] or Externally: yes at     Confirmed Nurse will call to complete assessment yes    Additional comments: no ([facility] PATIENTS NEED EXTENDED PREP)

## 2022-03-11 DIAGNOSIS — Z11.59 ENCOUNTER FOR SCREENING FOR OTHER VIRAL DISEASES: Primary | ICD-10-CM

## 2022-04-19 ENCOUNTER — LAB (OUTPATIENT)
Dept: LAB | Facility: CLINIC | Age: 62
End: 2022-04-19
Payer: COMMERCIAL

## 2022-04-19 DIAGNOSIS — Z11.59 ENCOUNTER FOR SCREENING FOR OTHER VIRAL DISEASES: ICD-10-CM

## 2022-04-19 PROCEDURE — U0003 INFECTIOUS AGENT DETECTION BY NUCLEIC ACID (DNA OR RNA); SEVERE ACUTE RESPIRATORY SYNDROME CORONAVIRUS 2 (SARS-COV-2) (CORONAVIRUS DISEASE [COVID-19]), AMPLIFIED PROBE TECHNIQUE, MAKING USE OF HIGH THROUGHPUT TECHNOLOGIES AS DESCRIBED BY CMS-2020-01-R: HCPCS

## 2022-04-19 PROCEDURE — U0005 INFEC AGEN DETEC AMPLI PROBE: HCPCS

## 2022-04-20 LAB — SARS-COV-2 RNA RESP QL NAA+PROBE: NEGATIVE

## 2022-04-21 RX ORDER — FLUMAZENIL 0.1 MG/ML
0.2 INJECTION, SOLUTION INTRAVENOUS
Status: CANCELLED | OUTPATIENT
Start: 2022-04-21 | End: 2022-04-22

## 2022-04-21 RX ORDER — ONDANSETRON 4 MG/1
4 TABLET, ORALLY DISINTEGRATING ORAL EVERY 6 HOURS PRN
Status: CANCELLED | OUTPATIENT
Start: 2022-04-21

## 2022-04-21 RX ORDER — PROCHLORPERAZINE MALEATE 10 MG
10 TABLET ORAL EVERY 6 HOURS PRN
Status: CANCELLED | OUTPATIENT
Start: 2022-04-21

## 2022-04-21 RX ORDER — NALOXONE HYDROCHLORIDE 0.4 MG/ML
0.4 INJECTION, SOLUTION INTRAMUSCULAR; INTRAVENOUS; SUBCUTANEOUS
Status: CANCELLED | OUTPATIENT
Start: 2022-04-21

## 2022-04-21 RX ORDER — NALOXONE HYDROCHLORIDE 0.4 MG/ML
0.2 INJECTION, SOLUTION INTRAMUSCULAR; INTRAVENOUS; SUBCUTANEOUS
Status: CANCELLED | OUTPATIENT
Start: 2022-04-21

## 2022-04-21 RX ORDER — ONDANSETRON 2 MG/ML
4 INJECTION INTRAMUSCULAR; INTRAVENOUS EVERY 6 HOURS PRN
Status: CANCELLED | OUTPATIENT
Start: 2022-04-21

## 2022-04-22 ENCOUNTER — HOSPITAL ENCOUNTER (OUTPATIENT)
Facility: AMBULATORY SURGERY CENTER | Age: 62
Discharge: HOME | End: 2022-04-22
Attending: INTERNAL MEDICINE | Admitting: INTERNAL MEDICINE
Payer: COMMERCIAL

## 2022-04-22 VITALS
OXYGEN SATURATION: 98 % | TEMPERATURE: 97.6 F | HEART RATE: 61 BPM | SYSTOLIC BLOOD PRESSURE: 114 MMHG | RESPIRATION RATE: 16 BRPM | DIASTOLIC BLOOD PRESSURE: 74 MMHG

## 2022-04-22 LAB — COLONOSCOPY: NORMAL

## 2022-04-22 PROCEDURE — 45380 COLONOSCOPY AND BIOPSY: CPT

## 2022-04-22 PROCEDURE — G8918 PT W/O PREOP ORDER IV AB PRO: HCPCS

## 2022-04-22 PROCEDURE — 88305 TISSUE EXAM BY PATHOLOGIST: CPT | Performed by: PATHOLOGY

## 2022-04-22 PROCEDURE — G8907 PT DOC NO EVENTS ON DISCHARG: HCPCS

## 2022-04-22 RX ORDER — LIDOCAINE 40 MG/G
CREAM TOPICAL
Status: DISCONTINUED | OUTPATIENT
Start: 2022-04-22 | End: 2022-04-23 | Stop reason: HOSPADM

## 2022-04-22 RX ORDER — FENTANYL CITRATE 50 UG/ML
INJECTION, SOLUTION INTRAMUSCULAR; INTRAVENOUS PRN
Status: DISCONTINUED | OUTPATIENT
Start: 2022-04-22 | End: 2022-04-22 | Stop reason: HOSPADM

## 2022-04-22 RX ORDER — ONDANSETRON 2 MG/ML
4 INJECTION INTRAMUSCULAR; INTRAVENOUS
Status: DISCONTINUED | OUTPATIENT
Start: 2022-04-22 | End: 2022-04-23 | Stop reason: HOSPADM

## 2022-04-22 NOTE — H&P
ENDOSCOPY PRE-SEDATION H&P FOR OUTPATIENT PROCEDURES    Susan Paulino  [id number]  1960    Procedure: Colonoscopy    Pre-procedure diagnosis: positive cologuard    Past medical history:   Past Medical History:   Diagnosis Date     Family history of skin cancer 6/27/2017     Family history of skin cancer      Hypertension        Past surgical history:   Past Surgical History:   Procedure Laterality Date     DRAIN PILONIDAL CYST SIMPL  1981     ORTHOPEDIC SURGERY      Right knee arthroscopic twice to clean meniscus     XR KNEE ARTHROGRAM RIGHT         Current Outpatient Medications   Medication     lisinopril (ZESTRIL) 10 MG tablet     Multiple Vitamins-Minerals (MULTIVITAMIN ADULT PO)     VITAMIN D PO     Current Facility-Administered Medications   Medication     lidocaine (LMX4) kit     lidocaine 1 % 0.1-1 mL     ondansetron (ZOFRAN) injection 4 mg     sodium chloride (PF) 0.9% PF flush 3 mL     sodium chloride (PF) 0.9% PF flush 3 mL       No Known Allergies    History of Anesthesia/Sedation Problems: no    Physical Exam:    Mental status: alert  Heart: Normal  Lung: Normal  Assessment of patient's airway: Normal  Other as pertinent for procedure: None     ASA Score: See Provation note    Mallampati score:  II - Faucial pillars and soft palate may be seen, but uvula is masked by the base of the tongue    Assessment/Plan:     The patient is an appropriate candidate to receive sedation.    Informed consent was discussed with the patient/family, including the risks, benefits, potential complications and any alternative options associated with sedation.    Patient assessment completed just prior to sedation and while under constant observation by the provider. Condition determined to be adequate for proceeding with sedation.    The specific risks for the procedure were discussed with the patient at the time of informed consent and include but are not limited to perforation which could require surgery, missing  significant neoplasm or lesion, hemorrhage and adverse sedative complication.      Varinder Awad MD

## 2022-04-27 LAB
PATH REPORT.COMMENTS IMP SPEC: NORMAL
PATH REPORT.FINAL DX SPEC: NORMAL
PATH REPORT.GROSS SPEC: NORMAL
PATH REPORT.MICROSCOPIC SPEC OTHER STN: NORMAL
PATH REPORT.RELEVANT HX SPEC: NORMAL
PHOTO IMAGE: NORMAL

## 2022-09-11 ENCOUNTER — HEALTH MAINTENANCE LETTER (OUTPATIENT)
Age: 62
End: 2022-09-11

## 2022-12-13 ENCOUNTER — MYC MEDICAL ADVICE (OUTPATIENT)
Dept: FAMILY MEDICINE | Facility: CLINIC | Age: 62
End: 2022-12-13

## 2022-12-13 DIAGNOSIS — I10 ESSENTIAL HYPERTENSION WITH GOAL BLOOD PRESSURE LESS THAN 140/90: ICD-10-CM

## 2022-12-13 NOTE — TELEPHONE ENCOUNTER
Routing to refill pool to review. Patient has follow-up appointment on 1/20/2023.    Glenna MEJIAN, RN

## 2022-12-14 RX ORDER — LISINOPRIL 10 MG/1
10 TABLET ORAL DAILY
Qty: 90 TABLET | Refills: 0 | Status: SHIPPED | OUTPATIENT
Start: 2022-12-14 | End: 2023-01-20

## 2022-12-14 NOTE — TELEPHONE ENCOUNTER
Appointments in Next Year    Jan 20, 2023  9:30 AM  (Arrive by 9:10 AM)  Preventative Adult Visit with LULI Jacobsen CNP  [facility] ([facility] ) [phone]   May 03, 2023  7:30 AM  (Arrive by 7:15 AM)  Return Visit with NATHANIEL Banks [facility] ([facility]) [phone]

## 2023-01-19 ASSESSMENT — ENCOUNTER SYMPTOMS
HEMATURIA: 0
DIZZINESS: 0
HEADACHES: 0
COUGH: 0
PALPITATIONS: 0
DYSURIA: 0
SORE THROAT: 0
WEAKNESS: 0
EYE PAIN: 0
NERVOUS/ANXIOUS: 0
NAUSEA: 0
CONSTIPATION: 0
DIARRHEA: 0
FREQUENCY: 0
CHILLS: 0
ARTHRALGIAS: 1
JOINT SWELLING: 0
HEMATOCHEZIA: 0
PARESTHESIAS: 0
HEARTBURN: 0
FEVER: 0
SHORTNESS OF BREATH: 0
MYALGIAS: 0
BREAST MASS: 0
ABDOMINAL PAIN: 0

## 2023-01-20 ENCOUNTER — OFFICE VISIT (OUTPATIENT)
Dept: FAMILY MEDICINE | Facility: CLINIC | Age: 63
End: 2023-01-20
Payer: COMMERCIAL

## 2023-01-20 VITALS
HEART RATE: 64 BPM | OXYGEN SATURATION: 99 % | HEIGHT: 64 IN | BODY MASS INDEX: 41.45 KG/M2 | DIASTOLIC BLOOD PRESSURE: 83 MMHG | WEIGHT: 242.8 LBS | SYSTOLIC BLOOD PRESSURE: 131 MMHG | TEMPERATURE: 97.6 F | RESPIRATION RATE: 12 BRPM

## 2023-01-20 DIAGNOSIS — Z13.0 SCREENING FOR DISORDER OF BLOOD AND BLOOD-FORMING ORGANS: ICD-10-CM

## 2023-01-20 DIAGNOSIS — Z00.00 ROUTINE GENERAL MEDICAL EXAMINATION AT A HEALTH CARE FACILITY: Primary | ICD-10-CM

## 2023-01-20 DIAGNOSIS — I10 ESSENTIAL HYPERTENSION WITH GOAL BLOOD PRESSURE LESS THAN 140/90: ICD-10-CM

## 2023-01-20 DIAGNOSIS — Z13.29 SCREENING FOR THYROID DISORDER: ICD-10-CM

## 2023-01-20 DIAGNOSIS — E66.01 MORBID OBESITY (H): ICD-10-CM

## 2023-01-20 DIAGNOSIS — Z13.6 CARDIOVASCULAR SCREENING; LDL GOAL LESS THAN 130: ICD-10-CM

## 2023-01-20 DIAGNOSIS — Z13.1 SCREENING FOR DIABETES MELLITUS (DM): ICD-10-CM

## 2023-01-20 DIAGNOSIS — Z12.31 ENCOUNTER FOR SCREENING MAMMOGRAM FOR BREAST CANCER: ICD-10-CM

## 2023-01-20 PROCEDURE — 99396 PREV VISIT EST AGE 40-64: CPT | Performed by: NURSE PRACTITIONER

## 2023-01-20 RX ORDER — LISINOPRIL 10 MG/1
10 TABLET ORAL DAILY
Qty: 90 TABLET | Refills: 3 | Status: SHIPPED | OUTPATIENT
Start: 2023-01-20 | End: 2024-04-15

## 2023-01-20 ASSESSMENT — ENCOUNTER SYMPTOMS
DIARRHEA: 0
HEADACHES: 0
ABDOMINAL PAIN: 0
FEVER: 0
DYSURIA: 0
NAUSEA: 0
CONSTIPATION: 0
HEMATURIA: 0
ARTHRALGIAS: 1
CHILLS: 0
COUGH: 0
SORE THROAT: 0
EYE PAIN: 0
PARESTHESIAS: 0
JOINT SWELLING: 0
HEARTBURN: 0
FREQUENCY: 0
MYALGIAS: 0
PALPITATIONS: 0
BREAST MASS: 0
HEMATOCHEZIA: 0
DIZZINESS: 0
NERVOUS/ANXIOUS: 0
WEAKNESS: 0
SHORTNESS OF BREATH: 0

## 2023-01-20 ASSESSMENT — PAIN SCALES - GENERAL: PAINLEVEL: MILD PAIN (2)

## 2023-01-20 NOTE — PROGRESS NOTES
Answers for HPI/ROS submitted by the patient on 1/19/2023  Frequency of exercise:: 2-3 days/week  Getting at least 3 servings of Calcium per day:: NO  Diet:: Regular (no restrictions)  Taking medications regularly:: Yes  Medication side effects:: None  Bi-annual eye exam:: Yes  Dental care twice a year:: Yes  Sleep apnea or symptoms of sleep apnea:: None  abdominal pain: No  Blood in stool: No  Blood in urine: No  chest pain: No  chills: No  congestion: No  constipation: No  cough: No  diarrhea: No  dizziness: No  ear pain: No  eye pain: No  nervous/anxious: No  fever: No  frequency: No  genital sores: No  headaches: No  hearing loss: No  heartburn: No  arthralgias: Yes  joint swelling: No  peripheral edema: Yes  mood changes: No  myalgias: No  nausea: No  dysuria: No  palpitations: No  Skin sensation changes: No  sore throat: No  urgency: No  rash: No  shortness of breath: No  visual disturbance: No  weakness: No  pelvic pain: No  vaginal bleeding: No  vaginal discharge: No  tenderness: No  breast mass: No  breast discharge: No  Additional concerns today:: No  Duration of exercise:: 15-30 minutes       SUBJECTIVE:   CC: Taylor is an 62 year old who presents for preventive health visit.     Patient has been advised of split billing requirements and indicates understanding: Yes  Healthy Habits:     Getting at least 3 servings of Calcium per day:  NO    Bi-annual eye exam:  Yes    Dental care twice a year:  Yes    Sleep apnea or symptoms of sleep apnea:  None    Diet:  Regular (no restrictions)    Frequency of exercise:  2-3 days/week    Duration of exercise:  15-30 minutes    Taking medications regularly:  Yes    Medication side effects:  None    PHQ-2 Total Score: 0    Additional concerns today:  No    Today's PHQ-2 Score:   PHQ-2 ( 1999 [employer]) 1/19/2023   Q1: Little interest or pleasure in doing things 0   Q2: Feeling down, depressed or hopeless 0   PHQ-2 Score 0   PHQ-2 Total Score (12-17 Years)- Positive if 3 or  more points; Administer PHQ-A if positive -   Q1: Little interest or pleasure in doing things Not at all   Q2: Feeling down, depressed or hopeless Not at all   PHQ-2 Score 0       Have you ever done Advance Care Planning? (For example, a Health Directive, POLST, or a discussion with a medical provider or your loved ones about your wishes): No, advance care planning information given to patient to review.  Patient plans to discuss their wishes with loved ones or provider.      Social History     Tobacco Use     Smoking status: Never     Smokeless tobacco: Never   Substance Use Topics     Alcohol use: Yes     Comment: minimal/socially          Alcohol Use 1/19/2023   Prescreen: >3 drinks/day or >7 drinks/week? No   Prescreen: >3 drinks/day or >7 drinks/week? -       Reviewed orders with patient.  Reviewed health maintenance and updated orders accordingly - Yes  Labs reviewed in EPIC  BP Readings from Last 3 Encounters:   01/20/23 131/83   04/22/22 114/74   11/23/21 136/78    Wt Readings from Last 3 Encounters:   01/20/23 110.1 kg (242 lb 12.8 oz)   11/23/21 108.9 kg (240 lb)   04/19/19 105.5 kg (232 lb 8 oz)                  Patient Active Problem List   Diagnosis     Essential hypertension with goal blood pressure less than 140/90     CARDIOVASCULAR SCREENING; LDL GOAL LESS THAN 130     Family history of skin cancer     Morbid obesity (H)     Past Surgical History:   Procedure Laterality Date     COLONOSCOPY N/A 4/22/2022    Procedure: COLONOSCOPY, WITH POLYPECTOMY AND BIOPSY;  Surgeon: Varinder Awad MD;  Location: [facility] OR     COLONOSCOPY WITH CO2 INSUFFLATION N/A 4/22/2022    Procedure: COLONOSCOPY, WITH CO2 INSUFFLATION;  Surgeon: Varinder Awad MD;  Location: [facility] OR     DRAIN PILONIDAL CYST [place] [id number]     ORTHOPEDIC SURGERY      Right knee arthroscopic twice to clean meniscus     XR KNEE ARTHROGRAM RIGHT         Social History     Tobacco Use     Smoking status: Never     Smokeless  tobacco: Never   Substance Use Topics     Alcohol use: Yes     Comment: minimal/socially      Family History   Problem Relation Age of Onset     Pancreatic Cancer Mother      Prostate Cancer Father      Hyperlipidemia Father      Diabetes Maternal Grandmother      Parkinsonism Sister      Breast Cancer Sister      Thyroid Disease Sister      Diabetes Maternal Aunt      Colon Cancer Maternal Uncle      Thyroid Disease Sister      Other Cancer Brother      Pneumonia Brother      Parkinsonism Sister      Coronary Artery Disease No family hx of      Hypertension No family hx of      Cerebrovascular Disease No family hx of      Depression No family hx of      Anxiety Disorder No family hx of      Asthma No family hx of      Genetic Disorder No family hx of          Current Outpatient Medications   Medication Sig Dispense Refill     lisinopril (ZESTRIL) 10 MG tablet Take 1 tablet (10 mg) by mouth daily 90 tablet 3     Multiple Vitamins-Minerals (MULTIVITAMIN ADULT PO) Take by mouth daily       VITAMIN D PO Take by mouth daily       No Known Allergies    Breast Cancer Screening:    FHS-7:   Breast CA Risk Assessment (FHS-7) 11/23/2021 12/14/2021 1/19/2023   Did any of your first-degree relatives have breast or ovarian cancer? Yes Yes Yes   Did any of your relatives have bilateral breast cancer? Unknown Unknown Unknown   Did any man in your family have breast cancer? No No No   Did any woman in your family have breast and ovarian cancer? Yes No Yes   Did any woman in your family have breast cancer before age 50 y? Yes Yes Yes   Do you have 2 or more relatives with breast and/or ovarian cancer? Yes Yes Yes   Do you have 2 or more relatives with breast and/or bowel cancer? Yes Yes Yes       Mammogram Screening: Recommended mammography every 1-2 years with patient discussion and risk factor consideration  Pertinent mammograms are reviewed under the imaging tab.    History of abnormal Pap smear: NO - age 30-65 PAP every 5 years  with negative HPV co-testing recommended  PAP / HPV Latest Ref Rng & Units 11/23/2021 6/23/2016   PAP   Negative for Intraepithelial Lesion or Malignancy (NILM) -   PAP (Historical) - - NIL   HPV16 Negative Negative -   HPV18 Negative Negative -   HRHPV Negative Negative -     Reviewed and updated as needed this visit by clinical staff                  Reviewed and updated as needed this visit by Provider                 Past Medical History:   Diagnosis Date     Arthritis 2-1518    identified in left knee by orthopedic dr     Family history of skin cancer 06/27/2017     Family history of skin cancer      Hypertension       Past Surgical History:   Procedure Laterality Date     COLONOSCOPY N/A 4/22/2022    Procedure: COLONOSCOPY, WITH POLYPECTOMY AND BIOPSY;  Surgeon: Varinder Awad MD;  Location: [facility]     COLONOSCOPY WITH CO2 INSUFFLATION N/A 4/22/2022    Procedure: COLONOSCOPY, WITH CO2 INSUFFLATION;  Surgeon: Varinder Awad MD;  Location: [facility]     DRAIN PILONIDAL CYST SIMPL  1981     ORTHOPEDIC SURGERY      Right knee arthroscopic twice to clean meniscus     XR KNEE ARTHROGRAM RIGHT         Review of Systems   Constitutional: Negative for chills and fever.   HENT: Negative for congestion, ear pain, hearing loss and sore throat.    Eyes: Negative for pain and visual disturbance.   Respiratory: Negative for cough and shortness of breath.    Cardiovascular: Positive for peripheral edema. Negative for chest pain and palpitations.   Gastrointestinal: Negative for abdominal pain, constipation, diarrhea, heartburn, hematochezia and nausea.   Breasts:  Negative for tenderness, breast mass and discharge.   Genitourinary: Negative for dysuria, frequency, genital sores, hematuria, pelvic pain, urgency, vaginal bleeding and vaginal discharge.   Musculoskeletal: Positive for arthralgias. Negative for joint swelling and myalgias.   Skin: Negative for rash.   Neurological: Negative for dizziness,  "weakness, headaches and paresthesias.   Psychiatric/Behavioral: Negative for mood changes. The patient is not nervous/anxious.         OBJECTIVE:   /83 (BP Location: Right arm, Patient Position: Sitting, Cuff Size: Adult Large)   Pulse 64   Temp 97.6  F (36.4  C) (Oral)   Resp 12   Ht 1.619 m (5' 3.74\")   Wt 110.1 kg (242 lb 12.8 oz)   SpO2 99%   BMI 42.02 kg/m    Physical Exam  GENERAL APPEARANCE: healthy, alert and no distress  EYES: Eyes grossly normal to inspection and conjunctivae and sclerae normal  HENT: ear canals and TM's normal, nose and mouth without ulcers or lesions, oropharynx clear and oral mucous membranes moist  NECK: no adenopathy, no asymmetry, masses, or scars and thyroid normal to palpation  RESP: lungs clear to auscultation - no rales, rhonchi or wheezes  BREAST: normal without masses, tenderness or nipple discharge and no palpable axillary masses or adenopathy  CV: regular rates and rhythm, no murmur, click or rub, no peripheral edema and peripheral pulses strong  ABDOMEN: soft, nontender, no hepatosplenomegaly, no masses and bowel sounds normal   (female): normal female external genitalia, normal urethral meatus, vaginal mucosal atrophy noted, normal cervix, adnexae, and uterus without masses or abnormal discharge  MS: no musculoskeletal defects are noted and gait is age appropriate without ataxia  SKIN: no suspicious lesions or rashes  NEURO: Normal strength and tone, sensory exam grossly normal, mentation intact and speech normal  PSYCH: mentation appears normal and affect normal/bright    Diagnostic Test Results:  Labs reviewed in Epic  Pending orders and results       ASSESSMENT/PLAN:   Susan was seen today for physical.    Diagnoses and all orders for this visit:    Routine general medical examination at a health care facility  -     REVIEW OF HEALTH MAINTENANCE PROTOCOL ORDERS    Essential hypertension with goal blood pressure less than 140/90  -     lisinopril " (ZESTRIL) 10 MG tablet; Take 1 tablet (10 mg) by mouth daily  -     Albumin Random Urine Quantitative with Creat Ratio; Future  -     Comprehensive metabolic panel; Future    CARDIOVASCULAR SCREENING; LDL GOAL LESS THAN 130  -     Lipid panel reflex to direct LDL Fasting; Future    Morbid obesity (H)  Healthy diet and exercise reviewed.  Limit pop and juice intake.  Risks of obesity discussed.  Encourage exercise.  Limit TV/Computer/game time to one hour a day.    Screening for diabetes mellitus (DM)  -     Comprehensive metabolic panel; Future    Screening for disorder of blood and blood-forming organs  -     CBC with platelets; Future    Screening for thyroid disorder  -     TSH with free T4 reflex; Future    Encounter for screening mammogram for breast cancer  -     *MA Screening Digital Bilateral; Future    PLAN:   Continue current medications as prescribed.   Patient needs to follow up in if no improvement,or sooner if worsening of symptoms or other symptoms develop.  FURTHER TESTING:       - mammogram  FUTURE LABS:       - Schedule a fasting blood draw   Work on weight loss  Regular exercise  Will follow up and/or notify patient of  results via My Chart to determine further need for followup  Follow up office visit in one year for annual health maintenance exam, sooner PRN.    Patient has been advised of split billing requirements and indicates understanding: Yes      COUNSELING:  Reviewed preventive health counseling, as reflected in patient instructions  Special attention given to:        Regular exercise       Healthy diet/nutrition       Vision screening       Osteoporosis prevention/bone health       Colorectal Cancer Screening       Consider Hep C screening for all patients one time for ages 18-79 years       The 10-year ASCVD risk score (Rosi PALMA, et al., 2019) is: 4.3%    Values used to calculate the score:      Age: 62 years      Sex: Female      Is Non- : No      Diabetic:  No      Tobacco smoker: No      Systolic Blood Pressure: 131 mmHg      Is BP treated: Yes      HDL Cholesterol: 62 mg/dL      Total Cholesterol: 173 mg/dL       Advance Care Planning        She reports that she has never smoked. She has never used smokeless tobacco.          LULI Jacobsen CNP  [facility]

## 2023-01-20 NOTE — PATIENT INSTRUCTIONS
PLAN:   1.   Symptomatic therapy suggested: Continue current medications as prescribed.   Increase calcium to 1000mg and 800iu Vit D    2.  Orders Placed This Encounter   Medications    lisinopril (ZESTRIL) 10 MG tablet     Sig: Take 1 tablet (10 mg) by mouth daily     Dispense:  90 tablet     Refill:  3     Orders Placed This Encounter   Procedures    REVIEW OF HEALTH MAINTENANCE PROTOCOL ORDERS    *MA Screening Digital Bilateral    Lipid panel reflex to direct LDL Fasting    Albumin Random Urine Quantitative with Creat Ratio    Comprehensive metabolic panel    TSH with free T4 reflex    CBC with platelets       3. Patient needs to follow up in if no improvement,or sooner if worsening of symptoms or other symptoms develop.  FURTHER TESTING:       - mammogram  FUTURE LABS:       - Schedule a fasting blood draw   Work on weight loss  Regular exercise  Will follow up and/or notify patient of  results via My Chart to determine further need for followup  Follow up office visit in one year for annual health maintenance exam, sooner PRN.    Preventive Health Recommendations  Female Ages 50 - 64    Yearly exam: See your health care provider every year in order to  Review health changes.   Discuss preventive care.    Review your medicines if your doctor has prescribed any.    Get a Pap test every three years (unless you have an abnormal result and your provider advises testing more often).  If you get Pap tests with HPV test, you only need to test every 5 years, unless you have an abnormal result.   You do not need a Pap test if your uterus was removed (hysterectomy) and you have not had cancer.  You should be tested each year for STDs (sexually transmitted diseases) if you're at risk.   Have a mammogram every 1 to 2 years.  Have a colonoscopy at age 50, or have a yearly FIT test (stool test). These exams screen for colon cancer.    Have a cholesterol test every 5 years, or more often if advised.  Have a diabetes test  (fasting glucose) every three years. If you are at risk for diabetes, you should have this test more often.   If you are at risk for osteoporosis (brittle bone disease), think about having a bone density scan (DEXA).    Shots: Get a flu shot each year. Get a tetanus shot every 10 years.    Nutrition:   Eat at least 5 servings of fruits and vegetables each day.  Eat whole-grain bread, whole-wheat pasta and brown rice instead of white grains and rice.  Get adequate Calcium and Vitamin D.     Lifestyle  Exercise at least 150 minutes a week (30 minutes a day, 5 days a week). This will help you control your weight and prevent disease.  Limit alcohol to one drink per day.  No smoking.   Wear sunscreen to prevent skin cancer.   See your dentist every six months for an exam and cleaning.  See your eye doctor every 1 to 2 years.

## 2023-01-22 ENCOUNTER — HEALTH MAINTENANCE LETTER (OUTPATIENT)
Age: 63
End: 2023-01-22

## 2023-03-02 ENCOUNTER — ANCILLARY PROCEDURE (OUTPATIENT)
Dept: MAMMOGRAPHY | Facility: CLINIC | Age: 63
End: 2023-03-02
Attending: NURSE PRACTITIONER
Payer: COMMERCIAL

## 2023-03-02 DIAGNOSIS — Z12.31 ENCOUNTER FOR SCREENING MAMMOGRAM FOR BREAST CANCER: ICD-10-CM

## 2023-03-02 PROCEDURE — 77067 SCR MAMMO BI INCL CAD: CPT | Mod: GC

## 2023-04-26 ENCOUNTER — MYC MEDICAL ADVICE (OUTPATIENT)
Dept: FAMILY MEDICINE | Facility: CLINIC | Age: 63
End: 2023-04-26
Payer: COMMERCIAL

## 2023-05-02 ENCOUNTER — LAB (OUTPATIENT)
Dept: LAB | Facility: CLINIC | Age: 63
End: 2023-05-02
Payer: COMMERCIAL

## 2023-05-02 DIAGNOSIS — Z13.1 SCREENING FOR DIABETES MELLITUS (DM): ICD-10-CM

## 2023-05-02 DIAGNOSIS — I10 ESSENTIAL HYPERTENSION WITH GOAL BLOOD PRESSURE LESS THAN 140/90: ICD-10-CM

## 2023-05-02 DIAGNOSIS — Z13.6 CARDIOVASCULAR SCREENING; LDL GOAL LESS THAN 130: ICD-10-CM

## 2023-05-02 DIAGNOSIS — Z13.0 SCREENING FOR DISORDER OF BLOOD AND BLOOD-FORMING ORGANS: ICD-10-CM

## 2023-05-02 DIAGNOSIS — Z13.29 SCREENING FOR THYROID DISORDER: ICD-10-CM

## 2023-05-02 LAB
ALBUMIN SERPL-MCNC: 3.7 G/DL (ref 3.4–5)
ALP SERPL-CCNC: 80 U/L (ref 40–150)
ALT SERPL W P-5'-P-CCNC: 29 U/L (ref 0–50)
ANION GAP SERPL CALCULATED.3IONS-SCNC: 6 MMOL/L (ref 3–14)
AST SERPL W P-5'-P-CCNC: 11 U/L (ref 0–45)
BILIRUB SERPL-MCNC: 0.7 MG/DL (ref 0.2–1.3)
BUN SERPL-MCNC: 11 MG/DL (ref 7–30)
CALCIUM SERPL-MCNC: 8.6 MG/DL (ref 8.5–10.1)
CHLORIDE BLD-SCNC: 108 MMOL/L (ref 94–109)
CHOLEST SERPL-MCNC: 161 MG/DL
CO2 SERPL-SCNC: 25 MMOL/L (ref 20–32)
CREAT SERPL-MCNC: 0.83 MG/DL (ref 0.52–1.04)
CREAT UR-MCNC: 112 MG/DL
ERYTHROCYTE [DISTWIDTH] IN BLOOD BY AUTOMATED COUNT: 12.9 % (ref 10–15)
FASTING STATUS PATIENT QL REPORTED: YES
GFR SERPL CREATININE-BSD FRML MDRD: 80 ML/MIN/1.73M2
GLUCOSE BLD-MCNC: 103 MG/DL (ref 70–99)
HCT VFR BLD AUTO: 40.8 % (ref 35–47)
HDLC SERPL-MCNC: 53 MG/DL
HGB BLD-MCNC: 13.8 G/DL (ref 11.7–15.7)
LDLC SERPL CALC-MCNC: 87 MG/DL
MCH RBC QN AUTO: 29.2 PG (ref 26.5–33)
MCHC RBC AUTO-ENTMCNC: 33.8 G/DL (ref 31.5–36.5)
MCV RBC AUTO: 86 FL (ref 78–100)
MICROALBUMIN UR-MCNC: 42 MG/L
MICROALBUMIN/CREAT UR: 37.5 MG/G CR (ref 0–25)
NONHDLC SERPL-MCNC: 108 MG/DL
PLATELET # BLD AUTO: 246 10E3/UL (ref 150–450)
POTASSIUM BLD-SCNC: 4.1 MMOL/L (ref 3.4–5.3)
PROT SERPL-MCNC: 7.1 G/DL (ref 6.8–8.8)
RBC # BLD AUTO: 4.73 10E6/UL (ref 3.8–5.2)
SODIUM SERPL-SCNC: 139 MMOL/L (ref 133–144)
T4 FREE SERPL-MCNC: 0.93 NG/DL (ref 0.76–1.46)
TRIGL SERPL-MCNC: 106 MG/DL
TSH SERPL DL<=0.005 MIU/L-ACNC: 7.63 MU/L (ref 0.4–4)
WBC # BLD AUTO: 5.6 10E3/UL (ref 4–11)

## 2023-05-02 PROCEDURE — 82570 ASSAY OF URINE CREATININE: CPT

## 2023-05-02 PROCEDURE — 82043 UR ALBUMIN QUANTITATIVE: CPT

## 2023-05-02 PROCEDURE — 80061 LIPID PANEL: CPT

## 2023-05-02 PROCEDURE — 84443 ASSAY THYROID STIM HORMONE: CPT

## 2023-05-02 PROCEDURE — 80053 COMPREHEN METABOLIC PANEL: CPT

## 2023-05-02 PROCEDURE — 36415 COLL VENOUS BLD VENIPUNCTURE: CPT

## 2023-05-02 PROCEDURE — 85027 COMPLETE CBC AUTOMATED: CPT

## 2023-05-02 PROCEDURE — 84439 ASSAY OF FREE THYROXINE: CPT

## 2023-05-03 ENCOUNTER — OFFICE VISIT (OUTPATIENT)
Dept: DERMATOLOGY | Facility: CLINIC | Age: 63
End: 2023-05-03
Payer: COMMERCIAL

## 2023-05-03 DIAGNOSIS — L82.1 SEBORRHEIC KERATOSES: ICD-10-CM

## 2023-05-03 DIAGNOSIS — D22.9 MULTIPLE BENIGN NEVI: ICD-10-CM

## 2023-05-03 DIAGNOSIS — D18.01 CHERRY ANGIOMA: ICD-10-CM

## 2023-05-03 DIAGNOSIS — L81.4 LENTIGINES: ICD-10-CM

## 2023-05-03 DIAGNOSIS — B35.4 TINEA CORPORIS: Primary | ICD-10-CM

## 2023-05-03 DIAGNOSIS — L30.9 DERMATITIS: ICD-10-CM

## 2023-05-03 PROCEDURE — 87220 TISSUE EXAM FOR FUNGI: CPT | Performed by: PHYSICIAN ASSISTANT

## 2023-05-03 PROCEDURE — 99214 OFFICE O/P EST MOD 30 MIN: CPT | Mod: 25 | Performed by: PHYSICIAN ASSISTANT

## 2023-05-03 RX ORDER — TRIAMCINOLONE ACETONIDE 0.25 MG/G
CREAM TOPICAL
Qty: 80 G | Refills: 0 | Status: SHIPPED | OUTPATIENT
Start: 2023-05-03

## 2023-05-03 RX ORDER — KETOCONAZOLE 20 MG/G
CREAM TOPICAL
Qty: 30 G | Refills: 0 | Status: SHIPPED | OUTPATIENT
Start: 2023-05-03

## 2023-05-03 NOTE — NURSING NOTE
Susan Paulino's goals for this visit include:   Chief Complaint   Patient presents with     Skin Check     Patient here for yearly skin check, areas of concern face and hands have eczema, Family HX of skin cancer       She requests these members of her care team be copied on today's visit information:     PCP: Nova Azevedo    Referring Provider:  No referring provider defined for this encounter.    There were no vitals taken for this visit.    Do you need any medication refills at today's visit? No         Henrietta Johnson EMT

## 2023-05-03 NOTE — PROGRESS NOTES
[facility] Dermatology Note  Encounter Date: May 3, 2023  Office Visit     Dermatology Problem List:  1. AK - L mid cheek x1 - s/p cryo 1/26/22  2. Tinea - KOH + - ketoconazole  3. dermatitis - triam 0.025% cream     Family History: BCC in brother  ____________________________________________    Assessment & Plan:    # Dermatitis. Mild. Chronic problem  - start triamcinolone 0.025% cream  - emollients    # Tinea corporis - R upper  Medial thigh   - KOH positive for hyphae  - start ketoconazole 2% cream every day for 10-14 days  - edu on etiology  - avoid use of topical steroids    # Multiple clinically benign nevi on the trunk and extremities. No treatment is necessary at this time unless the lesion changes or becomes symptomatic.    - ABCDEs of melanoma were discussed and self skin checks were advised.    # Seborrheic keratosis, non irritated on the trunk and extremities. Explained to patient benign nature of lesion. No treatment is necessary at this time unless the lesion changes or becomes symptomatic.     - Monitor for changes.    # Cherry Angiomas - trunk and extremities. Explained to patient benign nature of lesion. No treatment is necessary at this time unless the lesion changes or becomes symptomatic.      # Solar lentigines on the sun exposed skin areas. Benign nature was discussed. No further intervention required at this time.    - Sun precaution was advised including the use of sun screens of SPF 30 or higher, sun protective clothing, and avoidance of tanning beds.        Procedures Performed:   - KOH performed - result: positive    Follow-up: 1 year(s) in-person, or earlier for new or changing lesions    Staff and Scribe:     Scribe Disclosure:   John JARVIS, am serving as a scribe to document services personally performed by this physician, Marycarmen Ponce PA-C, based on data collection and the provider's statements to me.   Provider Disclosure:   The documentation recorded by  the scribe accurately reflects the services I personally performed and the decisions made by me.    All risks, benefits and alternatives were discussed with patient.  Patient is in agreement and understands the assessment and plan.  All questions were answered.    Marycarmen Ponce PA-C, MPAS  [facility] [place]: Phone: [phone], Fax: [phone]  [facility]: Phone: [phone],  Fax: [phone]  [facility]: Phone: [phone], Fax: [phone]    ____________________________________________    CC: No chief complaint on file.    HPI:  Ms. Susan Paulino is a(n) 62 year old female who presents today as a return patient for [facility]SE. Notes occasional itchy patches on lower legs, more in winter. Tries hydrocortisone without beefit. Also notes itchy patch near groin. No hx skin cancer.     Last seen 1/26/23 for a skin check. At that time, 1 AK was treated with cryotherapy.     Patient is otherwise feeling well, without additional skin concerns.    Labs Reviewed:  N/A    Physical Exam:  Vitals: There were no vitals taken for this visit.  SKIN: Full skin, which includes the head/face, both arms, chest, back, abdomen,both legs, genitalia and/or groin buttocks, digits and/or nails, was examined.  - Barrios type II.  - R upper medial thigh - erythematous patch with leading edge and central clearing/PIH  - R posterior calf with pink scaly patches  - There are dome shaped bright red papules on the trunk and extremities.   - Multiple regular brown pigmented macules and papules are identified on the trunk and extremities, <100.  - Scattered brown macules on sun exposed areas.  - There are waxy stuck on tan to brown papules on the trunk and extremities.    - No other lesions of concern on areas examined.     Medications:  Current Outpatient Medications   Medication     lisinopril (ZESTRIL) 10 MG tablet     Multiple  Vitamins-Minerals (MULTIVITAMIN ADULT PO)     VITAMIN D PO     No current facility-administered medications for this visit.      Past Medical History:   Patient Active Problem List   Diagnosis     Essential hypertension with goal blood pressure less than 140/90     CARDIOVASCULAR SCREENING; LDL GOAL LESS THAN 130     Family history of skin cancer     Morbid obesity (H)     Past Medical History:   Diagnosis Date     Arthritis 2-1518    identified in left knee by orthopedic      Family history of skin cancer 06/27/2017     Family history of skin cancer      Hypertension

## 2023-05-03 NOTE — LETTER
5/3/2023         RE: Susan Paulino  [address] [place] [address]        Dear Colleague,    Thank you for referring your patient, Susan Paulino, to the [facility]. Please see a copy of my visit note below.    [facility] Dermatology Note  Encounter Date: May 3, 2023  Office Visit     Dermatology Problem List:  1. AK - L mid cheek x1 - s/p cryo 1/26/22  2. Tinea - KOH + - ketoconazole  3. dermatitis - triam 0.025% cream     Family History: BCC in brother  ____________________________________________    Assessment & Plan:    # Dermatitis. Mild. Chronic problem  - start triamcinolone 0.025% cream  - emollients    # Tinea corporis - R upper  Medial thigh   - KOH positive for hyphae  - start ketoconazole 2% cream every day for 10-14 days  - edu on etiology  - avoid use of topical steroids    # Multiple clinically benign nevi on the trunk and extremities. No treatment is necessary at this time unless the lesion changes or becomes symptomatic.    - ABCDEs of melanoma were discussed and self skin checks were advised.    # Seborrheic keratosis, non irritated on the trunk and extremities. Explained to patient benign nature of lesion. No treatment is necessary at this time unless the lesion changes or becomes symptomatic.     - Monitor for changes.    # Cherry Angiomas - trunk and extremities. Explained to patient benign nature of lesion. No treatment is necessary at this time unless the lesion changes or becomes symptomatic.      # Solar lentigines on the sun exposed skin areas. Benign nature was discussed. No further intervention required at this time.    - Sun precaution was advised including the use of sun screens of SPF 30 or higher, sun protective clothing, and avoidance of tanning beds.        Procedures Performed:   - KOH performed - result: positive    Follow-up: 1 year(s) in-person, or earlier for new or changing lesions    Staff and Scribe:      Scribe Disclosure:   I, John Sood, am serving as a scribe to document services personally performed by this physician, Marycarmen Ponce PA-C, based on data collection and the provider's statements to me.   Provider Disclosure:   The documentation recorded by the scribe accurately reflects the services I personally performed and the decisions made by me.    All risks, benefits and alternatives were discussed with patient.  Patient is in agreement and understands the assessment and plan.  All questions were answered.    Marycarmen Ponce PA-C, [facility]  [facility]: Phone: [phone], Fax: [phone]  [facility]: Phone: [phone],  Fax: [phone]  [facility]: Phone: [phone], Fax: [phone]    ____________________________________________    CC: No chief complaint on file.    HPI:  Ms. Susan Paulino is a(n) 62 year old female who presents today as a return patient for FBSE. Notes occasional itchy patches on lower legs, more in winter. Tries hydrocortisone without beefit. Also notes itchy patch near groin. No hx skin cancer.     Last seen 1/26/23 for a skin check. At that time, 1 AK was treated with cryotherapy.     Patient is otherwise feeling well, without additional skin concerns.    Labs Reviewed:  N/A    Physical Exam:  Vitals: There were no vitals taken for this visit.  SKIN: Full skin, which includes the head/face, both arms, chest, back, abdomen,both legs, genitalia and/or groin buttocks, digits and/or nails, was examined.  - Barrios type II.  - R upper medial thigh - erythematous patch with leading edge and central clearing/PIH  - R posterior calf with pink scaly patches  - There are dome shaped bright red papules on the trunk and extremities.   - Multiple regular brown pigmented macules and papules are identified on the trunk and extremities, <100.  - Scattered brown macules  on sun exposed areas.  - There are waxy stuck on tan to brown papules on the trunk and extremities.    - No other lesions of concern on areas examined.     Medications:  Current Outpatient Medications   Medication     lisinopril (ZESTRIL) 10 MG tablet     Multiple Vitamins-Minerals (MULTIVITAMIN ADULT PO)     VITAMIN D PO     No current facility-administered medications for this visit.      Past Medical History:   Patient Active Problem List   Diagnosis     Essential hypertension with goal blood pressure less than 140/90     CARDIOVASCULAR SCREENING; LDL GOAL LESS THAN 130     Family history of skin cancer     Morbid obesity (H)     Past Medical History:   Diagnosis Date     Arthritis 2-1518    identified in left knee by orthopedic dr     Family history of skin cancer 06/27/2017     Family history of skin cancer      Hypertension        Again, thank you for allowing me to participate in the care of your patient.        Sincerely,        Marycarmen Ponce PA-C

## 2023-05-10 DIAGNOSIS — R79.89 ELEVATED TSH: Primary | ICD-10-CM

## 2023-05-11 NOTE — RESULT ENCOUNTER NOTE
Guero Paulino,    Attached are your test results.  -Normal red blood cell (hgb) levels, normal white blood cell count and normal platelet levels.  -Cholesterol levels (LDL,HDL, Triglycerides) are normal.  ADVISE: rechecking in 1 year.   -Liver and gallbladder tests (ALT,AST, Alk phos,bilirubin) are normal.  -Kidney function (GFR) is normal.  -Sodium is normal.  -Potassium is normal.  -Calcium is normal.  -Glucose is slight elevated and may be a sign of early diabetes (prediabetes). ADVISE:: eating a low carbohydrate diet, exercising, trying to lose weight (if necessary) and rechecking your glucose level in 12 months.  -TSH (thyroid stimulating hormone) level indicates hypothyroidism (an underactive thyroid).  This can cause a number of symptoms including weight gain, fatigue, high cholesterol, constipation, hair loss, cold intolerance).  ADVISE:rechecking your TSH in 1 month.   -Microalbumin (urine protein) level is elevated. This is suggestive of early damage to your kidneys from high blood pressure.  ADVISE: avoiding anti-inflamatory agents such as ibuprofen (Advil, Motrin) or naproxen (Aleve) as much as possible, keeping your blood pressure in a normal range, and continuing your medication (lisinopril) that helps protect your kidneys.  Also, this should be rechecked in 1 year.    Please contact us if you have any questions.    Nova Azevedo, CNP

## 2023-05-15 ENCOUNTER — TELEPHONE (OUTPATIENT)
Dept: FAMILY MEDICINE | Facility: CLINIC | Age: 63
End: 2023-05-15
Payer: COMMERCIAL

## 2023-05-15 NOTE — TELEPHONE ENCOUNTER
Writer closing encounter as patient has read MyChart lab message.    AUGUSTINA Carson  [facility]

## 2023-05-15 NOTE — TELEPHONE ENCOUNTER
Writer noted patient read lab results below on 5/13/23 at 11:49 am. Writer would like to confirm if provider has any other messages to relay that is not included in MyChart message?     AUGUSTINA Carson  [facility]            ----- Message from LULI Jacobsen CNP sent at 5/15/2023  7:10 AM CDT -----  Please call has not ready lab results message on My chart     Nova Azevedo NP, APRN CNP

## 2023-06-05 ENCOUNTER — MYC MEDICAL ADVICE (OUTPATIENT)
Dept: FAMILY MEDICINE | Facility: CLINIC | Age: 63
End: 2023-06-05
Payer: COMMERCIAL

## 2023-06-12 ENCOUNTER — LAB (OUTPATIENT)
Dept: LAB | Facility: CLINIC | Age: 63
End: 2023-06-12
Payer: COMMERCIAL

## 2023-06-12 DIAGNOSIS — R79.89 ELEVATED TSH: ICD-10-CM

## 2023-06-12 LAB
T4 FREE SERPL-MCNC: 1.11 NG/DL (ref 0.9–1.7)
TSH SERPL DL<=0.005 MIU/L-ACNC: 5.33 UIU/ML (ref 0.3–4.2)

## 2023-06-12 PROCEDURE — 86376 MICROSOMAL ANTIBODY EACH: CPT

## 2023-06-12 PROCEDURE — 84439 ASSAY OF FREE THYROXINE: CPT

## 2023-06-12 PROCEDURE — 36415 COLL VENOUS BLD VENIPUNCTURE: CPT

## 2023-06-12 PROCEDURE — 84443 ASSAY THYROID STIM HORMONE: CPT

## 2023-06-14 LAB — THYROPEROXIDASE AB SERPL-ACNC: 13 IU/ML

## 2023-06-15 NOTE — RESULT ENCOUNTER NOTE
Guero Paulino,    Attached are your test results.  Thyroid level on borderline. Would recheck next year with physical  Antibodies are normal    Please contact us if you have any questions.    Nova Azevedo, CNP

## 2023-10-24 ENCOUNTER — TRANSFERRED RECORDS (OUTPATIENT)
Dept: HEALTH INFORMATION MANAGEMENT | Facility: CLINIC | Age: 63
End: 2023-10-24
Payer: COMMERCIAL

## 2024-02-24 ENCOUNTER — HEALTH MAINTENANCE LETTER (OUTPATIENT)
Age: 64
End: 2024-02-24

## 2024-04-22 ENCOUNTER — TELEPHONE (OUTPATIENT)
Dept: FAMILY MEDICINE | Facility: CLINIC | Age: 64
End: 2024-04-22

## 2024-04-22 ENCOUNTER — OFFICE VISIT (OUTPATIENT)
Dept: FAMILY MEDICINE | Facility: CLINIC | Age: 64
End: 2024-04-22
Payer: COMMERCIAL

## 2024-04-22 VITALS
HEIGHT: 64 IN | TEMPERATURE: 97.3 F | HEART RATE: 54 BPM | SYSTOLIC BLOOD PRESSURE: 144 MMHG | RESPIRATION RATE: 16 BRPM | BODY MASS INDEX: 42.17 KG/M2 | DIASTOLIC BLOOD PRESSURE: 93 MMHG | OXYGEN SATURATION: 97 % | WEIGHT: 247 LBS

## 2024-04-22 DIAGNOSIS — Z12.31 VISIT FOR SCREENING MAMMOGRAM: ICD-10-CM

## 2024-04-22 DIAGNOSIS — Z13.29 SCREENING FOR THYROID DISORDER: ICD-10-CM

## 2024-04-22 DIAGNOSIS — I10 ESSENTIAL HYPERTENSION WITH GOAL BLOOD PRESSURE LESS THAN 140/90: ICD-10-CM

## 2024-04-22 DIAGNOSIS — E28.39 MENOPAUSE OVARIAN FAILURE: ICD-10-CM

## 2024-04-22 DIAGNOSIS — Z00.00 ROUTINE GENERAL MEDICAL EXAMINATION AT A HEALTH CARE FACILITY: Primary | ICD-10-CM

## 2024-04-22 DIAGNOSIS — Z13.0 SCREENING FOR DISORDER OF BLOOD AND BLOOD-FORMING ORGANS: ICD-10-CM

## 2024-04-22 DIAGNOSIS — E66.01 CLASS 3 SEVERE OBESITY DUE TO EXCESS CALORIES WITH SERIOUS COMORBIDITY AND BODY MASS INDEX (BMI) OF 40.0 TO 44.9 IN ADULT (H): ICD-10-CM

## 2024-04-22 DIAGNOSIS — Z13.1 SCREENING FOR DIABETES MELLITUS (DM): ICD-10-CM

## 2024-04-22 DIAGNOSIS — E66.813 CLASS 3 SEVERE OBESITY DUE TO EXCESS CALORIES WITH SERIOUS COMORBIDITY AND BODY MASS INDEX (BMI) OF 40.0 TO 44.9 IN ADULT (H): ICD-10-CM

## 2024-04-22 DIAGNOSIS — Z13.6 CARDIOVASCULAR SCREENING; LDL GOAL LESS THAN 130: ICD-10-CM

## 2024-04-22 PROCEDURE — 99213 OFFICE O/P EST LOW 20 MIN: CPT | Mod: 25 | Performed by: NURSE PRACTITIONER

## 2024-04-22 PROCEDURE — 99396 PREV VISIT EST AGE 40-64: CPT | Performed by: NURSE PRACTITIONER

## 2024-04-22 RX ORDER — LISINOPRIL 20 MG/1
20 TABLET ORAL DAILY
Qty: 90 TABLET | Refills: 3 | Status: SHIPPED | OUTPATIENT
Start: 2024-04-22

## 2024-04-22 SDOH — HEALTH STABILITY: PHYSICAL HEALTH: ON AVERAGE, HOW MANY MINUTES DO YOU ENGAGE IN EXERCISE AT THIS LEVEL?: 10 MIN

## 2024-04-22 SDOH — HEALTH STABILITY: PHYSICAL HEALTH: ON AVERAGE, HOW MANY DAYS PER WEEK DO YOU ENGAGE IN MODERATE TO STRENUOUS EXERCISE (LIKE A BRISK WALK)?: 2 DAYS

## 2024-04-22 ASSESSMENT — SOCIAL DETERMINANTS OF HEALTH (SDOH): HOW OFTEN DO YOU GET TOGETHER WITH FRIENDS OR RELATIVES?: TWICE A WEEK

## 2024-04-22 ASSESSMENT — PAIN SCALES - GENERAL: PAINLEVEL: MILD PAIN (2)

## 2024-04-22 NOTE — PROGRESS NOTES
"Preventive Care Visit  [facility]  Nova LULI Shetty CNP, Internal Medicine - Pediatrics  Apr 22, 2024      Assessment & Plan     Routine general medical examination at a health care facility  - REVIEW OF HEALTH MAINTENANCE PROTOCOL ORDERS    Essential hypertension with goal blood pressure less than 140/90  HTN Plan:  1)  Medication: dosage change: increase lisinopril 20mg a day   2)  Dietary sodium restriction  3)  Regular aerobic exercise  4)  Recheck in 1 year, sooner should new symptoms or   problems arise.  5) See todays orders.    Patient Education: Reviewed risks of hypertension and principles of   treatment.    - Albumin Random Urine Quantitative with Creat Ratio  - Comprehensive metabolic panel  - lisinopril (ZESTRIL) 20 MG tablet  Dispense: 90 tablet; Refill: 3    CARDIOVASCULAR SCREENING; LDL GOAL LESS THAN 130  - Lipid panel reflex to direct LDL Fasting    Screening for diabetes mellitus (DM)  - Comprehensive metabolic panel    Screening for disorder of blood and blood-forming organs  - CBC with platelets    Screening for thyroid disorder  - TSH with free T4 reflex    Visit for screening mammogram  - MA SCREENING DIGITAL BILAT - Future  (s+30)    Menopause ovarian failure  - DX Bone Density    Class 3 severe obesity due to excess calories with serious comorbidity and body mass index (BMI) of 40.0 to 44.9 in adult (H)  Healthy diet and exercise reviewed.  Limit pop and juice intake.  Risks of obesity discussed.  Encourage exercise.  Limit TV/Computer/game time to one hour a day.        Patient has been advised of split billing requirements and indicates understanding: Yes  Prescription drug management   Time spent by me doing chart review, history and exam, documentation and further activities per the note      BMI  Estimated body mass index is 42.8 kg/m  as calculated from the following:    Height as of this encounter: 1.618 m (5' 3.7\").    Weight as of this encounter: " 112 kg (247 lb).   Weight management plan: Discussed healthy diet and exercise guidelines    Counseling  Appropriate preventive services were discussed with this patient, including applicable screening as appropriate for fall prevention, nutrition, physical activity, Tobacco-use cessation, weight loss and cognition.  Checklist reviewing preventive services available has been given to the patient.  Reviewed patient's diet, addressing concerns and/or questions.   She is at risk for lack of exercise and has been provided with information to increase physical activity for the benefit of her well-being.   She is at risk for psychosocial distress and has been provided with information to reduce risk.       FUTURE APPOINTMENTS:       - Follow-up for annual visit or as needed  See Patient Instructions  FURTHER TESTING:       - DXA (bone density) scan       - mammogram  FUTURE LABS:       - Schedule a fasting blood draw   Work on weight loss  Regular exercise  Will follow up and/or notify patient of  results via My Chart to determine further need for followup   Follow up office visit in one year for annual health maintenance exam, sooner PRN.   Patient needs to follow up in if no improvement,or sooner if worsening of symptoms or other symptoms develop.    Subjective   Taylor is a 63 year old, presenting for the following:  Physical        4/22/2024    11:28 AM   Additional Questions   Roomed by Alona   Accompanied by self         4/22/2024    11:28 AM   Patient Reported Additional Medications   Patient reports taking the following new medications no        Health Care Directive  Patient does not have a Health Care Directive or Living Will: Discussed advance care planning with patient; however, patient declined at this time.    HPI        4/22/2024   General Health   How would you rate your overall physical health? Good   Feel stress (tense, anxious, or unable to sleep) Only a little   (!) STRESS CONCERN      4/22/2024    Nutrition   Three or more servings of calcium each day? Yes   Diet: Regular (no restrictions)   How many servings of fruit and vegetables per day? (!) 2-3   How many sweetened beverages each day? 0-1         4/22/2024   Exercise   Days per week of moderate/strenous exercise 2 days   Average minutes spent exercising at this level 10 min   (!) EXERCISE CONCERN      4/22/2024   Social Factors   Frequency of gathering with friends or relatives Twice a week   Worry food won't last until get money to buy more No   Food not last or not have enough money for food? No   Do you have housing?  Yes   Are you worried about losing your housing? No   Lack of transportation? No   Unable to get utilities (heat,electricity)? No         4/22/2024   Fall Risk   Fallen 2 or more times in the past year? No   Trouble with walking or balance? No          4/22/2024   Dental   Dentist two times every year? Yes         4/22/2024   TB Screening   Were you born outside of the [place]? No         Today's PHQ-2 Score:       4/22/2024     8:13 AM   PHQ-2 ( 1999 [employer])   Q1: Little interest or pleasure in doing things 0   Q2: Feeling down, depressed or hopeless 0   PHQ-2 Score 0   Q1: Little interest or pleasure in doing things Not at all   Q2: Feeling down, depressed or hopeless Not at all   PHQ-2 Score 0           4/22/2024   Substance Use   Alcohol more than 3/day or more than 7/wk No   Do you use any other substances recreationally? No     Social History     Tobacco Use    Smoking status: Never     Passive exposure: Never    Smokeless tobacco: Never   Vaping Use    Vaping status: Never Used   Substance Use Topics    Alcohol use: Yes     Comment: minimal/socially     Drug use: No           3/2/2023   LAST FHS-7 RESULTS   1st degree relative breast or ovarian cancer Yes   Any relative bilateral breast cancer No   Any male have breast cancer No   Any ONE woman have BOTH breast AND ovarian cancer No   Any woman with breast cancer before 50yrs Yes   2  or more relatives with breast AND/OR ovarian cancer Unknown   2 or more relatives with breast AND/OR bowel cancer No        Mammogram Screening - Mammogram every 1-2 years updated in Health Maintenance based on mutual decision making        4/22/2024   STI Screening   New sexual partner(s) since last STI/HIV test? No     History of abnormal Pap smear: No        Latest Ref Rng & Units 11/23/2021     9:31 AM 6/23/2016    12:00 AM   PAP / HPV   PAP  Negative for Intraepithelial Lesion or Malignancy (NILM)     PAP (Historical)   NIL    HPV 16 DNA Negative Negative     HPV 18 DNA Negative Negative     Other HR HPV Negative Negative       ASCVD Risk   The 10-year ASCVD risk score (Rosi PALMA, et al., 2019) is: 8.3%    Values used to calculate the score:      Age: 63 years      Sex: Female      Is Non- : No      Diabetic: No      Tobacco smoker: No      Systolic Blood Pressure: 169 mmHg      Is BP treated: Yes      HDL Cholesterol: 53 mg/dL      Total Cholesterol: 161 mg/dL         Reviewed and updated as needed this visit by Provider      Problems               Past Medical History:   Diagnosis Date    Arthritis 2-1518    identified in left knee by orthopedic dr    Family history of skin cancer 06/27/2017    Family history of skin cancer     Hypertension      Past Surgical History:   Procedure Laterality Date    COLONOSCOPY N/A 4/22/2022    Procedure: COLONOSCOPY, WITH POLYPECTOMY AND BIOPSY;  Surgeon: Varinder Awad MD;  Location: [facility] OR    COLONOSCOPY WITH CO2 INSUFFLATION N/A 4/22/2022    Procedure: COLONOSCOPY, WITH CO2 INSUFFLATION;  Surgeon: Varinder Awad MD;  Location: [facility] OR    DRAIN PILONIDAL CYST SIMPL  1981    ORTHOPEDIC SURGERY      Right knee arthroscopic twice to clean meniscus    XR KNEE ARTHROGRAM RIGHT       Lab work is in process  Labs reviewed in EPIC  BP Readings from Last 3 Encounters:   04/22/24 (!) 144/93   01/20/23 131/83   04/22/22 114/74     Wt Readings from Last 3 Encounters:   04/22/24 112 kg (247 lb)   01/20/23 110.1 kg (242 lb 12.8 oz)   11/23/21 108.9 kg (240 lb)                  Patient Active Problem List   Diagnosis    Essential hypertension with goal blood pressure less than 140/90    CARDIOVASCULAR SCREENING; LDL GOAL LESS THAN 130    Family history of skin cancer    Morbid obesity (H)     Past Surgical History:   Procedure Laterality Date    COLONOSCOPY N/A 4/22/2022    Procedure: COLONOSCOPY, WITH POLYPECTOMY AND BIOPSY;  Surgeon: Varinder Awad MD;  Location: [facility] OR    COLONOSCOPY WITH CO2 INSUFFLATION N/A 4/22/2022    Procedure: COLONOSCOPY, WITH CO2 INSUFFLATION;  Surgeon: Varinder Awad MD;  Location: [facility] OR    DRAIN PILONIDAL CYST SIMPL  1981    ORTHOPEDIC SURGERY      Right knee arthroscopic twice to clean meniscus    XR KNEE ARTHROGRAM RIGHT         Social History     Tobacco Use    Smoking status: Never     Passive exposure: Never    Smokeless tobacco: Never   Substance Use Topics    Alcohol use: Yes     Comment: minimal/socially      Family History   Problem Relation Age of Onset    Pancreatic Cancer Mother     Prostate Cancer Father     Hyperlipidemia Father     Diabetes Maternal Grandmother     Parkinsonism Sister     Breast Cancer Sister     Thyroid Disease Sister     Diabetes Maternal Aunt     Colon Cancer Maternal Uncle     Thyroid Disease Sister     Other Cancer Brother     Pneumonia Brother     Parkinsonism Sister     Coronary Artery Disease No family hx of     Hypertension No family hx of     Cerebrovascular Disease No family hx of     Depression No family hx of     Anxiety Disorder No family hx of     Asthma No family hx of     Genetic Disorder No family hx of          Current Outpatient Medications   Medication Sig Dispense Refill    ketoconazole (NIZORAL) 2 % external cream Apply topically daily to the groin for 10-14 days 30 g 0    lisinopril (ZESTRIL) 20 MG tablet Take 1 tablet (20 mg) by  "mouth daily 90 tablet 3    Multiple Vitamins-Minerals (MULTIVITAMIN ADULT PO) Take by mouth daily      triamcinolone (KENALOG) 0.025 % cream Apply topically BID to AA of eczema, avoid use on the groin/face 80 g 0    VITAMIN D PO Take by mouth daily      tirzepatide-Weight Management (ZEPBOUND) 2.5 MG/0.5ML prefilled pen Inject 0.5 mLs (2.5 mg) Subcutaneous every 7 days 2 mL 0     No Known Allergies    CONSTITUTIONAL:NEGATIVE for fever, chills, change in weight  INTEGUMENTARY/SKIN: NEGATIVE for worrisome rashes, moles or lesions  EYES: NEGATIVE for vision changes or irritation  ENT: NEGATIVE for ear, mouth and throat problems  RESP:NEGATIVE for significant cough or SOB  BREAST: NEGATIVE for masses, tenderness or discharge  CV: NEGATIVE for chest pain, palpitations or peripheral edema  GI: NEGATIVE for nausea, abdominal pain, heartburn, or change in bowel habits   menopausal female: amenorrhea, no unusual urinary symptoms, and no unusual vaginal symptoms  MUSCULOSKELETAL:NEGATIVE for significant arthralgias or myalgia  NEURO: NEGATIVE for weakness, dizziness or paresthesias  ENDOCRINE: POSITIVE  for weight gain and NEGATIVE for polydipsia, polyphagia, and polyuria  HEME/ALLERGY/IMMUNE: NEGATIVE for bleeding problems  PSYCHIATRIC: NEGATIVE for changes in mood or affect        Objective    Exam  BP (!) 144/93 (BP Location: Right arm, Patient Position: Sitting, Cuff Size: Adult Large)   Pulse 54   Temp 97.3  F (36.3  C) (Temporal)   Resp 16   Ht 1.618 m (5' 3.7\")   Wt 112 kg (247 lb)   SpO2 97%   BMI 42.80 kg/m     Estimated body mass index is 42.8 kg/m  as calculated from the following:    Height as of this encounter: 1.618 m (5' 3.7\").    Weight as of this encounter: 112 kg (247 lb). Second BP: 144/93    Physical Exam  GENERAL: healthy, alert, no distress, and obese  EYES: Eyes grossly normal to inspection and conjunctivae and sclerae normal  HENT: ear canals and TM's normal, nose and mouth without ulcers or " lesions  NECK: no adenopathy, no asymmetry, masses, or scars  RESP: lungs clear to auscultation - no rales, rhonchi or wheezes  BREAST: normal without masses, tenderness or nipple discharge and no palpable axillary masses or adenopathy  CV: regular rates and rhythm, no murmur, click or rub, peripheral pulses strong, and no peripheral edema  ABDOMEN: soft, nontender, no hepatosplenomegaly, no masses and bowel sounds normal   (female): deferred  MS: no gross musculoskeletal defects noted, no edema  SKIN: no suspicious lesions or rashes  NEURO: Normal strength and tone, mentation intact and speech normal  PSYCH: mentation appears normal, affect normal/bright  LYMPH: no cervical, supraclavicular, axillary, or inguinal adenopathy    Pelvic deferred     Signed Electronically by: LULI Jacobsen CNP

## 2024-04-22 NOTE — PATIENT INSTRUCTIONS
PLAN:   1.   Symptomatic therapy suggested: increase lisinopril to 20 mg a day.  2.  Orders Placed This Encounter   Medications    lisinopril (ZESTRIL) 20 MG tablet     Sig: Take 1 tablet (20 mg) by mouth daily     Dispense:  90 tablet     Refill:  3    Semaglutide-Weight Management (WEGOVY) 0.25 MG/0.5ML pen     Sig: Inject 0.25 mg Subcutaneous once a week     Dispense:  2 mL     Refill:  0     Orders Placed This Encounter   Procedures    REVIEW OF HEALTH MAINTENANCE PROTOCOL ORDERS    MA SCREENING DIGITAL BILAT - Future  (s+30)    DX Bone Density    Albumin Random Urine Quantitative with Creat Ratio    Lipid panel reflex to direct LDL Fasting    CBC with platelets    Comprehensive metabolic panel    TSH with free T4 reflex       3.  FURTHER TESTING:       - DXA (bone density) scan       - mammogram  FUTURE LABS:       - Schedule a fasting blood draw   Work on weight loss  Regular exercise  Will follow up and/or notify patient of  results via My Chart to determine further need for followup   Follow up office visit in one year for annual health maintenance exam, sooner PRN.   Patient needs to follow up in if no improvement,or sooner if worsening of symptoms or other symptoms develop.    Preventive Care Advice   This is general advice given by our system to help you stay healthy. However, your care team may have specific advice just for you. Please talk to your care team about your preventive care needs.  Nutrition  Eat 5 or more servings of fruits and vegetables each day.  Try wheat bread, brown rice and whole grain pasta (instead of white bread, rice, and pasta).  Get enough calcium and vitamin D. Check the label on foods and aim for 100% of the RDA (recommended daily allowance).  Lifestyle  Exercise at least 150 minutes each week   (30 minutes a day, 5 days a week).  Do muscle strengthening activities 2 days a week. These help control your weight and prevent disease.  No smoking.  Wear sunscreen to prevent skin  cancer.  Have a dental exam and cleaning every 6 months.  Yearly exams  See your health care team every year to talk about:  Any changes in your health.  Any medicines your care team has prescribed.  Preventive care, family planning, and ways to prevent chronic diseases.  Shots (vaccines)   HPV shots (up to age 26), if you've never had them before.  Hepatitis B shots (up to age 59), if you've never had them before.  COVID-19 shot: Get this shot when it's due.  Flu shot: Get a flu shot every year.  Tetanus shot: Get a tetanus shot every 10 years.  Pneumococcal, hepatitis A, and RSV shots: Ask your care team if you need these based on your risk.  Shingles shot (for age 50 and up).  General health tests  Diabetes screening:  Starting at age 35, Get screened for diabetes at least every 3 years.  If you are younger than age 35, ask your care team if you should be screened for diabetes.  Cholesterol test: At age 39, start having a cholesterol test every 5 years, or more often if advised.  Bone density scan (DEXA): At age 50, ask your care team if you should have this scan for osteoporosis (brittle bones).  Hepatitis C: Get tested at least once in your life.  STIs (sexually transmitted infections)  Before age 24: Ask your care team if you should be screened for STIs.  After age 24: Get screened for STIs if you're at risk. You are at risk for STIs (including HIV) if:  You are sexually active with more than one person.  You don't use condoms every time.  You or a partner was diagnosed with a sexually transmitted infection.  If you are at risk for HIV, ask about PrEP medicine to prevent HIV.  Get tested for HIV at least once in your life, whether you are at risk for HIV or not.  Cancer screening tests  Cervical cancer screening: If you have a cervix, begin getting regular cervical cancer screening tests at age 21. Most people who have regular screenings with normal results can stop after age 65. Talk about this with your  provider.  Breast cancer scan (mammogram): If you've ever had breasts, begin having regular mammograms starting at age 40. This is a scan to check for breast cancer.  Colon cancer screening: It is important to start screening for colon cancer at age 45.  Have a colonoscopy test every 10 years (or more often if you're at risk) Or, ask your provider about stool tests like a FIT test every year or Cologuard test every 3 years.  To learn more about your testing options, visit: https://www.SiteBrand/050459.pdf.  For help making a decision, visit: https://bit.[employer] Alert/hj19115.  Prostate cancer screening test: If you have a prostate and are age 55 to 69, ask your provider if you would benefit from a yearly prostate cancer screening test.  Lung cancer screening: If you are a current or former smoker age 50 to 80, ask your care team if ongoing lung cancer screenings are right for you.  For informational purposes only. Not to replace the advice of your health care provider. Copyright   2023 [employer]. All rights reserved. Clinically reviewed by the [facility] Transitions Program. [employer] [id number] - REV 01/24.

## 2024-04-23 ENCOUNTER — TELEPHONE (OUTPATIENT)
Dept: FAMILY MEDICINE | Facility: CLINIC | Age: 64
End: 2024-04-23
Payer: COMMERCIAL

## 2024-04-23 RX ORDER — TIRZEPATIDE 2.5 MG/.5ML
INJECTION, SOLUTION SUBCUTANEOUS
Refills: 0 | OUTPATIENT
Start: 2024-04-23

## 2024-04-23 NOTE — TELEPHONE ENCOUNTER
Retail Pharmacy Prior Authorization Team   Phone: [phone]    Changes Requested     ZEPBOUND 2.5 MG/0.5ML prefilled pen         Changed from: tirzepatide-Weight Management (ZEPBOUND) 2.5 MG/0.5ML prefilled pen     Possible duplicate: Xandersidney to review recent actions on this medication    Sig: INJECT 0.5 MLS (2.5 MG) SUBCUTANEOUS EVERY 7 DAYS    Disp: Not specified (Pharmacy requested: 2 each)    Refills: 0    Start: 4/22/2024    Class: E-Prescribe    Non-formulary For: Essential hypertension with goal blood pressure less than 140/90, Class 3 severe obesity due to excess calories with serious comorbidity and body mass index (BMI) of 40.0 to 44.9 in adult (H)    Last ordered: Yesterday (4/22/2024) by LULI Jacobsen CNP    Last refill: 4/22/2024    Rx #: [id number]    Pharmacy comment: Alternative Requested:DRUG NOT COVERED PLEASE START PA OR SEND ALTERNATIVE. THANKS.

## 2024-05-04 ENCOUNTER — HEALTH MAINTENANCE LETTER (OUTPATIENT)
Age: 64
End: 2024-05-04

## 2024-05-07 NOTE — TELEPHONE ENCOUNTER
PA Initiation    Medication: TIRZEPATIDE-WEIGHT MANAGEMENT 2.5 MG/0.5ML SC SOAJ  Insurance Company: Other (see comments)Comment:  [employer]  Ph[facility] Filling [id number] [place] [facility] [address] [place]  Filling Pharmacy Phone: [phone]  Filling Pharmacy Fax: [phone]  Start Date: 5/7/2024

## 2024-05-07 NOTE — TELEPHONE ENCOUNTER
PRIOR AUTHORIZATION DENIED    Medication: TIRZEPATIDE-WEIGHT MANAGEMENT 2.5 MG/0.5ML SC SOAJ  Insurance Company: Other (see comments)Comment:  [employer]  Denial Date: 5/7/2024  Denial Reason(s): Plan exclusion, no path to coverage.     Appeal Information: N/A  Patient Notified: No, care team must notify on denials.

## 2024-05-31 ENCOUNTER — MYC MEDICAL ADVICE (OUTPATIENT)
Dept: FAMILY MEDICINE | Facility: CLINIC | Age: 64
End: 2024-05-31
Payer: COMMERCIAL

## 2024-05-31 NOTE — TELEPHONE ENCOUNTER
Noted OV notes from 4/22/24 patient needing fasting labs with orders placed. Called patient and assisted with lab appointment, scheduled for 6/5/24.     Noted call attempts were made for bone density test on 4/25/24. Advise patient she can call to imaging's main scheduling line at [phone] for bone density test, informed orders are in. Patient verbalized understanding.     AUGUSTINA Carson  [facility]

## 2024-06-05 ENCOUNTER — LAB (OUTPATIENT)
Dept: LAB | Facility: CLINIC | Age: 64
End: 2024-06-05
Payer: COMMERCIAL

## 2024-06-05 DIAGNOSIS — Z13.1 SCREENING FOR DIABETES MELLITUS (DM): ICD-10-CM

## 2024-06-05 DIAGNOSIS — Z13.6 CARDIOVASCULAR SCREENING; LDL GOAL LESS THAN 130: ICD-10-CM

## 2024-06-05 DIAGNOSIS — Z13.0 SCREENING FOR DISORDER OF BLOOD AND BLOOD-FORMING ORGANS: ICD-10-CM

## 2024-06-05 DIAGNOSIS — I10 ESSENTIAL HYPERTENSION WITH GOAL BLOOD PRESSURE LESS THAN 140/90: ICD-10-CM

## 2024-06-05 DIAGNOSIS — Z13.29 SCREENING FOR THYROID DISORDER: ICD-10-CM

## 2024-06-05 LAB
ERYTHROCYTE [DISTWIDTH] IN BLOOD BY AUTOMATED COUNT: 13 % (ref 10–15)
HCT VFR BLD AUTO: 42.4 % (ref 35–47)
HGB BLD-MCNC: 13.9 G/DL (ref 11.7–15.7)
MCH RBC QN AUTO: 29.1 PG (ref 26.5–33)
MCHC RBC AUTO-ENTMCNC: 32.8 G/DL (ref 31.5–36.5)
MCV RBC AUTO: 89 FL (ref 78–100)
PLATELET # BLD AUTO: 201 10E3/UL (ref 150–450)
RBC # BLD AUTO: 4.78 10E6/UL (ref 3.8–5.2)
WBC # BLD AUTO: 5.4 10E3/UL (ref 4–11)

## 2024-06-05 PROCEDURE — 84439 ASSAY OF FREE THYROXINE: CPT

## 2024-06-05 PROCEDURE — 82043 UR ALBUMIN QUANTITATIVE: CPT

## 2024-06-05 PROCEDURE — 80053 COMPREHEN METABOLIC PANEL: CPT

## 2024-06-05 PROCEDURE — 85027 COMPLETE CBC AUTOMATED: CPT

## 2024-06-05 PROCEDURE — 36415 COLL VENOUS BLD VENIPUNCTURE: CPT

## 2024-06-05 PROCEDURE — 82570 ASSAY OF URINE CREATININE: CPT

## 2024-06-05 PROCEDURE — 84443 ASSAY THYROID STIM HORMONE: CPT

## 2024-06-05 PROCEDURE — 80061 LIPID PANEL: CPT

## 2024-06-05 NOTE — RESULT ENCOUNTER NOTE
Guero Paulino,    Attached are your test results.  -Normal red blood cell (hgb) levels, normal white blood cell count and normal platelet levels.   Please contact us if you have any questions.    Nova Azevedo, CNP

## 2024-06-06 LAB
ALBUMIN SERPL BCG-MCNC: 4.2 G/DL (ref 3.5–5.2)
ALP SERPL-CCNC: 83 U/L (ref 40–150)
ALT SERPL W P-5'-P-CCNC: 20 U/L (ref 0–50)
ANION GAP SERPL CALCULATED.3IONS-SCNC: 10 MMOL/L (ref 7–15)
AST SERPL W P-5'-P-CCNC: 19 U/L (ref 0–45)
BILIRUB SERPL-MCNC: 0.6 MG/DL
BUN SERPL-MCNC: 15.8 MG/DL (ref 8–23)
CALCIUM SERPL-MCNC: 9 MG/DL (ref 8.8–10.2)
CHLORIDE SERPL-SCNC: 103 MMOL/L (ref 98–107)
CHOLEST SERPL-MCNC: 167 MG/DL
CREAT SERPL-MCNC: 0.88 MG/DL (ref 0.51–0.95)
CREAT UR-MCNC: 42.2 MG/DL
DEPRECATED HCO3 PLAS-SCNC: 25 MMOL/L (ref 22–29)
EGFRCR SERPLBLD CKD-EPI 2021: 74 ML/MIN/1.73M2
FASTING STATUS PATIENT QL REPORTED: YES
FASTING STATUS PATIENT QL REPORTED: YES
GLUCOSE SERPL-MCNC: 99 MG/DL (ref 70–99)
HDLC SERPL-MCNC: 49 MG/DL
LDLC SERPL CALC-MCNC: 101 MG/DL
MICROALBUMIN UR-MCNC: <12 MG/L
MICROALBUMIN/CREAT UR: NORMAL MG/G{CREAT}
NONHDLC SERPL-MCNC: 118 MG/DL
POTASSIUM SERPL-SCNC: 4.8 MMOL/L (ref 3.4–5.3)
PROT SERPL-MCNC: 6.8 G/DL (ref 6.4–8.3)
SODIUM SERPL-SCNC: 138 MMOL/L (ref 135–145)
T4 FREE SERPL-MCNC: 1.1 NG/DL (ref 0.9–1.7)
TRIGL SERPL-MCNC: 83 MG/DL
TSH SERPL DL<=0.005 MIU/L-ACNC: 5.7 UIU/ML (ref 0.3–4.2)

## 2024-06-10 DIAGNOSIS — R79.89 ELEVATED TSH: Primary | ICD-10-CM

## 2024-06-11 NOTE — RESULT ENCOUNTER NOTE
Guero Paulino,    Attached are your test results.  -LDL(bad) cholesterol level is elevated, HDL(good) cholesterol level is low which can increase your heart disease risk.  A diet high in fat and simple carbohydrates, genetics and being overweight can contribute to this. ADVISE: exercising 150 minutes of aerobic exercise per week (30 minutes for 5 days per week or 50 minutes for 3 days per week are options) and eating a low saturated fat/low carbohydrate diet are helpful to improve this. In 12 months, you should recheck your fasting cholesterol panel by scheduling a lab-only appointment.  -Liver and gallbladder tests are normal (ALT,AST, Alk phos, bilirubin), kidney function is normal (Cr, GFR), sodium is normal, potassium is normal, calcium is normal, glucose is normal.  -TSH (thyroid stimulating hormone) level indicates hypothyroidism (an underactive thyroid).  This can cause a number of symptoms including weight gain, fatigue, high cholesterol, constipation, hair loss, cold intolerance).  ADVISE:rechecking your TSH in 1 month.   -Microalbumin (urine protein) test is normal.  ADVISE: rechecking this annually.   Please contact us if you have any questions.    Nova Azevedo, CNP

## 2024-08-05 ENCOUNTER — MYC MEDICAL ADVICE (OUTPATIENT)
Dept: FAMILY MEDICINE | Facility: CLINIC | Age: 64
End: 2024-08-05
Payer: COMMERCIAL

## 2024-08-06 ENCOUNTER — NURSE TRIAGE (OUTPATIENT)
Dept: FAMILY MEDICINE | Facility: CLINIC | Age: 64
End: 2024-08-06
Payer: COMMERCIAL

## 2024-08-06 DIAGNOSIS — U07.1 INFECTION DUE TO 2019 NOVEL CORONAVIRUS: Primary | ICD-10-CM

## 2024-08-06 NOTE — TELEPHONE ENCOUNTER
RN COVID TREATMENT VISIT  08/06/24    The patient has been triaged and does not require a higher level of care.    Susan Paulino  63 year old  Current weight? 240lbs    Has the patient been seen by a primary care provider at an [facility] or [facility] within the past two years? Yes.   Have you been in close proximity to/do you have a known exposure to a person with a confirmed case of influenza? No.     General treatment eligibility:  Date of positive COVID test (PCR or at home)?  8/05     Are you or have you been hospitalized for this COVID-19 infection? No.   Have you received monoclonal antibodies or antiviral treatment for COVID-19 since this positive test? No.   Do you have any of the following conditions that place you at risk of being very sick from COVID-19?   - Age 50 years or older  - Cerebrovascular disease, any history of stroke or transient ischemic attack (TIA)   - Overweight or Obesity (BMI >85th percentile or BMI 25 or higher)  Yes, patient has at least one high risk condition as noted above.     Current COVID symptoms:   - cough  - headache  - sore throat  - congestion or runny nose  Yes. Patient has at least one symptom as selected.     How many days since symptoms started? 5 days or less. Established patient, 12 years or older weighing at least 88.2 lbs, who has symptoms that started in the past 5 days, has not been hospitalized nor received treatment already, and is at risk for being very sick from COVID-19.     Treatment eligibility by RN:  Are you currently pregnant or nursing? No  Do you have a clinically significant hypersensitivity to nirmatrelvir or ritonavir, or toxic epidermal necrolysis (TEN) or Mon-Dexter Syndrome? No  Do you have a history of hepatitis, any hepatic impairment on the Problem List (such as Child-Lehman Class C, cirrhosis, fatty liver disease, alcoholic liver disease), or was the last liver lab (hepatic panel, ALT, AST, ALK Phos,  bilirubin) elevated in the past 6 months? No  Do you have any history of severe renal impairment (eGFR < 30mL/min)? No    Is patient eligible to continue? Yes, patient meets all eligibility requirements for the RN COVID treatment (as denoted by all no responses above).     Current Outpatient Medications   Medication Sig Dispense Refill    ketoconazole (NIZORAL) 2 % external cream Apply topically daily to the groin for 10-14 days 30 g 0    lisinopril (ZESTRIL) 20 MG tablet Take 1 tablet (20 mg) by mouth daily 90 tablet 3    Multiple Vitamins-Minerals (MULTIVITAMIN ADULT PO) Take by mouth daily      tirzepatide-Weight Management (ZEPBOUND) 2.5 MG/0.5ML prefilled pen Inject 0.5 mLs (2.5 mg) Subcutaneous every 7 days 2 mL 0    triamcinolone (KENALOG) 0.025 % cream Apply topically BID to AA of eczema, avoid use on the groin/face 80 g 0    VITAMIN D PO Take by mouth daily         Medications from List 1 of the standing order (on medications that exclude the use of Paxlovid) that patient is taking: NONE. Is patient taking Bao's Wort? No  Is patient taking [place] Choice's Wort or any meds from List 1? No.   Medications from List 2 of the standing order (on meds that provider needs to adjust) that patient is taking: NONE. Is patient on any of the meds from List 2? No.   Medications from List 3 of standing order (on meds that a RN needs to adjust) that patient is taking: NONE. Is patient on any meds from List 3? No.     Paxlovid has an approximate 90% reduction in hospitalization. Paxlovid can possibly cause altered sense of taste, diarrhea (loose, watery stools), high blood pressure, muscle aches.     Would patient like a Paxlovid prescription?   Yes.   Lab Results   Component Value Date    GFRESTIMATED 74 06/05/2024       Was last eGFR reduced? No, eGFR 60 or greater/ No Result on record. Patient can receive the normal renal function dose. [facility]ax[facility] Rx [facility] [place], [place]    Temporary  change to home medications: None    All medication adjustments (holds, etc) were discussed with the patient and patient was asked to repeat back (teachback) their med adjustment.  Did patient understand med adjustment? No medication adjustments needed.       Reviewed the following instructions with the patient:    Paxlovid (nimatrelvir and ritonavir)    How it works  Two medicines (nirmatrelvir and ritonavir) are taken together. They stop the virus from growing. Less amount of virus is easier for your body to fight.    How to take  Medicine comes in a daily container with both medicine tablets. Take by mouth twice daily (once in the morning, once at night) for 5 days.  The number of tablets to take varies by patient.  Don't chew or break capsules. Swallow whole.    When to take  Take as soon as possible after positive COVID-19 test result, and within 5 days of your first symptoms.    Possible side effects  Can cause altered sense of taste, diarrhea (loose, watery stools), high blood pressure, muscle aches.    Pam Kebede RN, BSN  [facility]          Reason for Disposition   HIGH RISK patient (e.g., weak immune system, age > 64 years, obesity with BMI of 30 or higher, pregnant, chronic lung disease or other chronic medical condition) and COVID symptoms (e.g., cough, fever)  (Exceptions: Already seen by doctor or NP/PA and no new or worsening symptoms.)    Additional Information   Negative: SEVERE difficulty breathing (e.g., struggling for each breath, speaks in single words)   Negative: Difficult to awaken or acting confused (e.g., disoriented, slurred speech)   Negative: Bluish (or gray) lips or face now   Negative: Shock suspected (e.g., cold/pale/clammy skin, too weak to stand, low BP, rapid pulse)   Negative: Sounds like a life-threatening emergency to the triager   Negative: Diagnosed or suspected COVID-19 and symptoms lasting 3 or more weeks   Negative: COVID-19 exposure and no symptoms   Negative:  COVID-19 vaccine reaction suspected (e.g., fever, headache, muscle aches) occurring 1 to 3 days after getting vaccine   Negative: COVID-19 vaccine, questions about   Negative: Lives with someone known to have influenza (flu test positive) and flu-like symptoms (e.g., cough, runny nose, sore throat, SOB; with or without fever)   Negative: Possible COVID-19 symptoms and triager concerned about severity of symptoms or other causes   Negative: COVID-19 and breastfeeding, questions about   Negative: SEVERE or constant chest pain or pressure  (Exception: Mild central chest pain, present only when coughing.)   Negative: MODERATE difficulty breathing (e.g., speaks in phrases, SOB even at rest, pulse 100-120)   Negative: Headache and stiff neck (can't touch chin to chest)   Negative: Oxygen level (e.g., pulse oximetry) 90% or lower   Negative: Chest pain or pressure  (Exception: MILD central chest pain, present only when coughing.)   Negative: Drinking very little and dehydration suspected (e.g., no urine > 12 hours, very dry mouth, very lightheaded)   Negative: Patient sounds very sick or weak to the triager   Negative: MILD difficulty breathing (e.g., minimal/no SOB at rest, SOB with walking, pulse <100)   Negative: Fever > 103 F (39.4 C)   Negative: Fever > 101 F (38.3 C) and over 60 years of age   Negative: Fever > 100.0 F (37.8 C) and bedridden (e.g., CVA, chronic illness, recovering from surgery)    Protocols used: Coronavirus (COVID-19) Diagnosed or [id number]

## 2024-08-06 NOTE — TELEPHONE ENCOUNTER
Left message on answering machine for patient to call back to [phone].    Reviewed chart pt qualifies for treatment based on age, obesity and hypertension.  Pt has normal gfr and liver tests.  Pt does have ketoconazole on chart, may need to adjust if currently taking.   Angie Solis BSN, RN

## 2024-08-13 ENCOUNTER — TELEPHONE (OUTPATIENT)
Dept: FAMILY MEDICINE | Facility: CLINIC | Age: 64
End: 2024-08-13
Payer: COMMERCIAL

## 2024-08-13 NOTE — TELEPHONE ENCOUNTER
Patient Quality Outreach    Patient is due for the following:   Hypertension -  BP check    Next Steps:   Schedule a nurse only visit for blood pressure check    Type of outreach:    Sent Fios message.    Next Steps:  Reach out within 90 days via Fios.    Max number of attempts reached: No. Will try again in 90 days if patient still on fail list.    Questions for provider review:    None           Diane L. Schoenherr, RN

## 2025-03-24 ENCOUNTER — PATIENT OUTREACH (OUTPATIENT)
Dept: CARE COORDINATION | Facility: CLINIC | Age: 65
End: 2025-03-24
Payer: COMMERCIAL

## 2025-04-07 ENCOUNTER — PATIENT OUTREACH (OUTPATIENT)
Dept: CARE COORDINATION | Facility: CLINIC | Age: 65
End: 2025-04-07
Payer: COMMERCIAL

## 2025-05-17 ENCOUNTER — HEALTH MAINTENANCE LETTER (OUTPATIENT)
Age: 65
End: 2025-05-17

## 2025-06-05 SDOH — HEALTH STABILITY: PHYSICAL HEALTH: ON AVERAGE, HOW MANY DAYS PER WEEK DO YOU ENGAGE IN MODERATE TO STRENUOUS EXERCISE (LIKE A BRISK WALK)?: 2 DAYS

## 2025-06-05 SDOH — HEALTH STABILITY: PHYSICAL HEALTH: ON AVERAGE, HOW MANY MINUTES DO YOU ENGAGE IN EXERCISE AT THIS LEVEL?: 90 MIN

## 2025-06-05 ASSESSMENT — SOCIAL DETERMINANTS OF HEALTH (SDOH): HOW OFTEN DO YOU GET TOGETHER WITH FRIENDS OR RELATIVES?: THREE TIMES A WEEK

## 2025-06-06 ENCOUNTER — RESULTS FOLLOW-UP (OUTPATIENT)
Dept: FAMILY MEDICINE | Facility: CLINIC | Age: 65
End: 2025-06-06

## 2025-06-06 ENCOUNTER — OFFICE VISIT (OUTPATIENT)
Dept: FAMILY MEDICINE | Facility: CLINIC | Age: 65
End: 2025-06-06
Payer: COMMERCIAL

## 2025-06-06 VITALS
HEIGHT: 63 IN | BODY MASS INDEX: 43.32 KG/M2 | SYSTOLIC BLOOD PRESSURE: 158 MMHG | TEMPERATURE: 97.4 F | DIASTOLIC BLOOD PRESSURE: 90 MMHG | OXYGEN SATURATION: 99 % | HEART RATE: 57 BPM | WEIGHT: 244.5 LBS | RESPIRATION RATE: 18 BRPM

## 2025-06-06 DIAGNOSIS — E28.39 MENOPAUSE OVARIAN FAILURE: ICD-10-CM

## 2025-06-06 DIAGNOSIS — Z00.00 ROUTINE GENERAL MEDICAL EXAMINATION AT A HEALTH CARE FACILITY: Primary | ICD-10-CM

## 2025-06-06 DIAGNOSIS — Z80.3 FAMILY HISTORY OF MALIGNANT NEOPLASM OF BREAST: ICD-10-CM

## 2025-06-06 DIAGNOSIS — E66.813 CLASS 3 SEVERE OBESITY DUE TO EXCESS CALORIES WITH SERIOUS COMORBIDITY AND BODY MASS INDEX (BMI) OF 40.0 TO 44.9 IN ADULT (H): ICD-10-CM

## 2025-06-06 DIAGNOSIS — Z13.6 CARDIOVASCULAR SCREENING; LDL GOAL LESS THAN 130: ICD-10-CM

## 2025-06-06 DIAGNOSIS — Z23 ENCOUNTER FOR ADMINISTRATION OF VACCINE: ICD-10-CM

## 2025-06-06 DIAGNOSIS — Z13.29 SCREENING FOR THYROID DISORDER: ICD-10-CM

## 2025-06-06 DIAGNOSIS — Z13.0 SCREENING FOR DISORDER OF BLOOD AND BLOOD-FORMING ORGANS: ICD-10-CM

## 2025-06-06 DIAGNOSIS — I10 ESSENTIAL HYPERTENSION WITH GOAL BLOOD PRESSURE LESS THAN 140/90: ICD-10-CM

## 2025-06-06 DIAGNOSIS — Z13.1 SCREENING FOR DIABETES MELLITUS (DM): ICD-10-CM

## 2025-06-06 PROBLEM — E66.01 MORBID OBESITY (H): Status: RESOLVED | Noted: 2021-11-30 | Resolved: 2025-06-06

## 2025-06-06 LAB
ALBUMIN SERPL BCG-MCNC: 4.3 G/DL (ref 3.5–5.2)
ALP SERPL-CCNC: 77 U/L (ref 40–150)
ALT SERPL W P-5'-P-CCNC: 21 U/L (ref 0–50)
ANION GAP SERPL CALCULATED.3IONS-SCNC: 10 MMOL/L (ref 7–15)
AST SERPL W P-5'-P-CCNC: 20 U/L (ref 0–45)
BILIRUB SERPL-MCNC: 0.7 MG/DL
BUN SERPL-MCNC: 14 MG/DL (ref 8–23)
CALCIUM SERPL-MCNC: 10 MG/DL (ref 8.8–10.4)
CHLORIDE SERPL-SCNC: 103 MMOL/L (ref 98–107)
CHOLEST SERPL-MCNC: 172 MG/DL
CREAT SERPL-MCNC: 0.94 MG/DL (ref 0.51–0.95)
CREAT UR-MCNC: 27.5 MG/DL
EGFRCR SERPLBLD CKD-EPI 2021: 68 ML/MIN/1.73M2
ERYTHROCYTE [DISTWIDTH] IN BLOOD BY AUTOMATED COUNT: 13 % (ref 10–15)
EST. AVERAGE GLUCOSE BLD GHB EST-MCNC: 105 MG/DL
FASTING STATUS PATIENT QL REPORTED: NO
FASTING STATUS PATIENT QL REPORTED: NO
GLUCOSE SERPL-MCNC: 98 MG/DL (ref 70–99)
HBA1C MFR BLD: 5.3 % (ref 0–5.6)
HCO3 SERPL-SCNC: 26 MMOL/L (ref 22–29)
HCT VFR BLD AUTO: 38.9 % (ref 35–47)
HDLC SERPL-MCNC: 49 MG/DL
HGB BLD-MCNC: 12.8 G/DL (ref 11.7–15.7)
LDLC SERPL CALC-MCNC: 106 MG/DL
MCH RBC QN AUTO: 28.7 PG (ref 26.5–33)
MCHC RBC AUTO-ENTMCNC: 32.9 G/DL (ref 31.5–36.5)
MCV RBC AUTO: 87 FL (ref 78–100)
MICROALBUMIN UR-MCNC: <12 MG/L
MICROALBUMIN/CREAT UR: NORMAL MG/G{CREAT}
NONHDLC SERPL-MCNC: 123 MG/DL
PLATELET # BLD AUTO: 235 10E3/UL (ref 150–450)
POTASSIUM SERPL-SCNC: 4.9 MMOL/L (ref 3.4–5.3)
PROT SERPL-MCNC: 7.1 G/DL (ref 6.4–8.3)
RBC # BLD AUTO: 4.46 10E6/UL (ref 3.8–5.2)
SODIUM SERPL-SCNC: 139 MMOL/L (ref 135–145)
T4 FREE SERPL-MCNC: 1.14 NG/DL (ref 0.9–1.7)
TRIGL SERPL-MCNC: 85 MG/DL
TSH SERPL DL<=0.005 MIU/L-ACNC: 4.44 UIU/ML (ref 0.3–4.2)
WBC # BLD AUTO: 5.2 10E3/UL (ref 4–11)

## 2025-06-06 PROCEDURE — 80053 COMPREHEN METABOLIC PANEL: CPT | Performed by: NURSE PRACTITIONER

## 2025-06-06 PROCEDURE — 84439 ASSAY OF FREE THYROXINE: CPT | Performed by: NURSE PRACTITIONER

## 2025-06-06 PROCEDURE — 84443 ASSAY THYROID STIM HORMONE: CPT | Performed by: NURSE PRACTITIONER

## 2025-06-06 PROCEDURE — 36415 COLL VENOUS BLD VENIPUNCTURE: CPT | Performed by: NURSE PRACTITIONER

## 2025-06-06 PROCEDURE — 82570 ASSAY OF URINE CREATININE: CPT | Performed by: NURSE PRACTITIONER

## 2025-06-06 PROCEDURE — 80061 LIPID PANEL: CPT | Performed by: NURSE PRACTITIONER

## 2025-06-06 PROCEDURE — 82043 UR ALBUMIN QUANTITATIVE: CPT | Performed by: NURSE PRACTITIONER

## 2025-06-06 PROCEDURE — 85027 COMPLETE CBC AUTOMATED: CPT | Performed by: NURSE PRACTITIONER

## 2025-06-06 PROCEDURE — 86376 MICROSOMAL ANTIBODY EACH: CPT | Performed by: NURSE PRACTITIONER

## 2025-06-06 PROCEDURE — 83036 HEMOGLOBIN GLYCOSYLATED A1C: CPT | Performed by: NURSE PRACTITIONER

## 2025-06-06 RX ORDER — HYDROCHLOROTHIAZIDE 25 MG/1
25 TABLET ORAL DAILY
Qty: 90 TABLET | Refills: 3 | Status: SHIPPED | OUTPATIENT
Start: 2025-06-06

## 2025-06-06 ASSESSMENT — PAIN SCALES - GENERAL: PAINLEVEL_OUTOF10: MODERATE PAIN (4)

## 2025-06-06 NOTE — PROGRESS NOTES
Preventive Care Visit  [facility]  Nova LULI Shetty CNP, Family Medicine  Jun 6, 2025      Assessment & Plan     Routine general medical examination at a health care facility  - REVIEW OF HEALTH MAINTENANCE PROTOCOL ORDERS    Essential hypertension with goal blood pressure less than 140/90  HTN Plan:  1)  Medication: start on hydrochlorothiazide   2)  Dietary sodium restriction  3)  Regular aerobic exercise  4)  Recheck in 1 year, sooner should new symptoms or   problems arise.  5) See todays orders.    Patient Education: Reviewed risks of hypertension and principles of   treatment.  - hydroCHLOROthiazide (HYDRODIURIL) 25 MG tablet  Dispense: 90 tablet; Refill: 3  - Comprehensive metabolic panel  - Albumin Random Urine Quantitative with Creat Ratio    CARDIOVASCULAR SCREENING; LDL GOAL LESS THAN 130  - Lipid panel reflex to direct LDL Fasting    Screening for diabetes mellitus (DM)  - Comprehensive metabolic panel  - Hemoglobin A1c    Screening for disorder of blood and blood-forming organs  - CBC with platelets    Screening for thyroid disorder  - TSH with free T4 reflex  - Thyroid peroxidase antibody  - T4 free    Class 3 severe obesity due to excess calories with serious comorbidity and body mass index (BMI) of 40.0 to 44.9 in adult (H)  Healthy diet and exercise reviewed.  Limit pop and juice intake.  Risks of obesity discussed.  Encourage exercise.  Limit TV/Computer/game time to one hour a day.  .- COMPOUNDED NON-CONTROLLED SUBSTANCE (CMPD RX) - PHARMACY TO MIX COMPOUNDED MEDICATION  Dispense: 30 mL; Refill: 0    Family history of malignant neoplasm of breast  - Adult Oncology/Hematology  Referral    Encounter for administration of vaccine  - Pneumococcal 20 Valent Conjugate (PCV20)    Menopause ovarian failure  - DX Bone Density      Patient has been advised of split billing requirements and indicates understanding: Yes        BMI  Estimated body mass index is 42.97  "kg/m  as calculated from the following:    Height as of this encounter: 1.607 m (5' 3.25\").    Weight as of this encounter: 110.9 kg (244 lb 8 oz).   Weight management plan: Discussed healthy diet and exercise guidelines    Counseling  Appropriate preventive services were addressed with this patient via screening, questionnaire, or discussion as appropriate for fall prevention, nutrition, physical activity, Tobacco-use cessation, social engagement, weight loss and cognition.  Checklist reviewing preventive services available has been given to the patient.  Reviewed patient's diet, addressing concerns and/or questions.   She is at risk for lack of exercise and has been provided with information to increase physical activity for the benefit of her well-being.       Follow-up    Follow-up Visit   Expected date:  Jun 05, 2026 (Approximate)      Follow Up Appointment Details:     Follow-up with whom?: PCP    Follow-Up for what?: Adult Preventive    How?: In Person                 Subjective   Taylor is a 64 year old, presenting for the following:  Physical        6/6/2025     8:51 AM   Additional Questions   Roomed by Lloyd   Accompanied by Self          HPI       Hypertension Follow-up    Do you check your blood pressure regularly outside of the clinic? No   Are you following a low salt diet? Yes  Are your blood pressures ever more than 140 on the top number (systolic) OR more   than 90 on the bottom number (diastolic), for example 140/90? N/A    BP Readings from Last 2 Encounters:   06/06/25 (!) 158/90   04/22/24 (!) 144/93       Advance Care Planning    Discussed advance care planning with patient; informed AVS has link to [employer].        6/5/2025   General Health   How would you rate your overall physical health? Good   Feel stress (tense, anxious, or unable to sleep) Not at all         6/5/2025   Nutrition   Three or more servings of calcium each day? Yes   Diet: Regular (no restrictions)   How many servings " of fruit and vegetables per day? (!) 2-3   How many sweetened beverages each day? 0-1         6/5/2025   Exercise   Days per week of moderate/strenous exercise 2 days   Average minutes spent exercising at this level 90 min   (!) EXERCISE CONCERN      6/5/2025   Social Factors   Frequency of gathering with friends or relatives Three times a week   Worry food won't last until get money to buy more No   Food not last or not have enough money for food? No   Do you have housing? (Housing is defined as stable permanent housing and does not include staying outside in a car, in a tent, in an abandoned building, in an overnight shelter, or couch-surfing.) Yes   Are you worried about losing your housing? No   Lack of transportation? No   Unable to get utilities (heat,electricity)? No         6/5/2025   Fall Risk   Fallen 2 or more times in the past year? No   Trouble with walking or balance? No          6/5/2025   Dental   Dentist two times every year? Yes         Today's PHQ-2 Score:       6/5/2025     8:43 PM   PHQ-2 ( 1999 [employer])   Q1: Little interest or pleasure in doing things 0   Q2: Feeling down, depressed or hopeless 0   PHQ-2 Score 0    Q1: Little interest or pleasure in doing things Not at all   Q2: Feeling down, depressed or hopeless Not at all   PHQ-2 Score 0       Patient-reported           6/5/2025   Substance Use   Alcohol more than 3/day or more than 7/wk No   Do you use any other substances recreationally? No     Social History     Tobacco Use    Smoking status: Never     Passive exposure: Never    Smokeless tobacco: Never   Vaping Use    Vaping status: Never Used   Substance Use Topics    Alcohol use: Yes     Comment: minimal/socially     Drug use: No           3/2/2023   LAST FHS-7 RESULTS   1st degree relative breast or ovarian cancer Yes   Any relative bilateral breast cancer No   Any male have breast cancer No   Any ONE woman have BOTH breast AND ovarian cancer No   Any woman with breast cancer before  50yrs Yes   2 or more relatives with breast AND/OR ovarian cancer Unknown   2 or more relatives with breast AND/OR bowel cancer No        Mammogram Screening - Mammogram every 1-2 years updated in Health Maintenance based on mutual decision making        6/5/2025   STI Screening   New sexual partner(s) since last STI/HIV test? No     History of abnormal Pap smear: No - age 30- 64 PAP with HPV every 5 years recommended        Latest Ref Rng & Units 11/23/2021     9:31 AM 6/23/2016    12:00 AM   PAP / HPV   PAP  Negative for Intraepithelial Lesion or Malignancy (NILM)     PAP (Historical)   NIL    HPV 16 DNA Negative Negative     HPV 18 DNA Negative Negative     Other HR HPV Negative Negative       ASCVD Risk   The 10-year ASCVD risk score (Rosi DK, et al., 2019) is: 8.8%    Values used to calculate the score:      Age: 64 years      Sex: Female      Is Non- : No      Diabetic: No      Tobacco smoker: No      Systolic Blood Pressure: 158 mmHg      Is BP treated: Yes      HDL Cholesterol: 49 mg/dL      Total Cholesterol: 172 mg/dL       Reviewed and updated as needed this visit by Provider   Tobacco  Allergies  Meds  Problems  Med Hx  Surg Hx  Fam Hx            Past Medical History:   Diagnosis Date    Arthritis 2-1518    identified in left knee by orthopedic dr    Family history of skin cancer 06/27/2017    Family history of skin cancer     Hypertension      Past Surgical History:   Procedure Laterality Date    COLONOSCOPY N/A 4/22/2022    Procedure: COLONOSCOPY, WITH POLYPECTOMY AND BIOPSY;  Surgeon: Varinder Awad MD;  Location: [facility] OR    COLONOSCOPY WITH CO2 INSUFFLATION N/A 4/22/2022    Procedure: COLONOSCOPY, WITH CO2 INSUFFLATION;  Surgeon: Varinder Awad MD;  Location: [facility] OR    DRAIN PILONIDAL CYST [facility]  1981    ORTHOPEDIC SURGERY      Right knee arthroscopic twice to clean meniscus    XR KNEE ARTHROGRAM RIGHT       Lab work is in process  Labs  reviewed in EPIC  BP Readings from Last 3 Encounters:   06/06/25 (!) 158/90   04/22/24 (!) 144/93   01/20/23 131/83    Wt Readings from Last 3 Encounters:   06/06/25 110.9 kg (244 lb 8 oz)   04/22/24 112 kg (247 lb)   01/20/23 110.1 kg (242 lb 12.8 oz)                  Patient Active Problem List   Diagnosis    Essential hypertension with goal blood pressure less than 140/90    CARDIOVASCULAR SCREENING; LDL GOAL LESS THAN 130    Family history of skin cancer    Morbid obesity (H)     Past Surgical History:   Procedure Laterality Date    COLONOSCOPY N/A 4/22/2022    Procedure: COLONOSCOPY, WITH POLYPECTOMY AND BIOPSY;  Surgeon: Varinder Awad MD;  Location: [facility] OR    COLONOSCOPY WITH CO2 INSUFFLATION N/A 4/22/2022    Procedure: COLONOSCOPY, WITH CO2 INSUFFLATION;  Surgeon: Varinder Awad MD;  Location: [facility] OR    DRAIN PILONIDAL CYST SIMPL  1981    ORTHOPEDIC SURGERY      Right knee arthroscopic twice to clean meniscus    XR KNEE ARTHROGRAM RIGHT         Social History     Tobacco Use    Smoking status: Never     Passive exposure: Never    Smokeless tobacco: Never   Substance Use Topics    Alcohol use: Yes     Comment: minimal/socially      Family History   Problem Relation Age of Onset    Pancreatic Cancer Mother     Prostate Cancer Father     Hyperlipidemia Father     Diabetes Maternal Grandmother     Parkinsonism Sister     Breast Cancer Sister     Thyroid Disease Sister     Diabetes Maternal Aunt     Colon Cancer Maternal Uncle     Thyroid Disease Sister     Other Cancer Brother     Pneumonia Brother     Parkinsonism Sister     Coronary Artery Disease No family hx of     Hypertension No family hx of     Cerebrovascular Disease No family hx of     Depression No family hx of     Anxiety Disorder No family hx of     Asthma No family hx of     Genetic Disorder No family hx of          Current Outpatient Medications   Medication Sig Dispense Refill    COMPOUNDED NON-CONTROLLED SUBSTANCE (CMPD  "RX) - PHARMACY TO MIX COMPOUNDED MEDICATION Place 0.5 mg under the tongue daily. Semiglutide 2mg/ml sublingual. Do not eat or drink 30 minutes after taking 30 mL 0    hydroCHLOROthiazide (HYDRODIURIL) 25 MG tablet Take 1 tablet (25 mg) by mouth daily. 90 tablet 3    ketoconazole (NIZORAL) 2 % external cream Apply topically daily to the groin for 10-14 days 30 g 0    lisinopril (ZESTRIL) 20 MG tablet Take 1 tablet (20 mg) by mouth daily. 90 tablet 0    Multiple Vitamins-Minerals (MULTIVITAMIN ADULT PO) Take by mouth daily      triamcinolone (KENALOG) 0.025 % cream Apply topically BID to AA of eczema, avoid use on the groin/face 80 g 0    VITAMIN D PO Take by mouth daily       No Known Allergies    CONSTITUTIONAL:NEGATIVE for fever, chills, change in weight  INTEGUMENTARY/SKIN: NEGATIVE for worrisome rashes, moles or lesions  EYES: NEGATIVE for vision changes or irritation  ENT: NEGATIVE for ear, mouth and throat problems  RESP:NEGATIVE for significant cough or SOB  BREAST: NEGATIVE for masses, tenderness or discharge  CV: POSITIVE for HX HTN and lower extremity edema and NEGATIVE for chest pain/chest pressure, irregular heart beat, and palpitations  GI: NEGATIVE for nausea, abdominal pain, heartburn, or change in bowel habits   menopausal female: amenorrhea, no unusual urinary symptoms, and no unusual vaginal symptoms  MUSCULOSKELETAL:POSITIVE  for joint pain hurt left knee. Fall but getting better  and NEGATIVE for joint swelling  and joint warmth   NEURO: NEGATIVE for weakness, dizziness or paresthesias  ENDOCRINE: NEGATIVE for temperature intolerance, skin/hair changes  HEME/ALLERGY/IMMUNE: NEGATIVE for bleeding problems  PSYCHIATRIC: NEGATIVE for changes in mood or affect            Objective    Exam  BP (!) 158/90 (BP Location: Right arm, Patient Position: Sitting, Cuff Size: Adult Large)   Pulse 57   Temp 97.4  F (36.3  C) (Tympanic)   Resp 18   Ht 1.607 m (5' 3.25\")   Wt 110.9 kg (244 lb 8 oz)   SpO2 " "99%   BMI 42.97 kg/m     Estimated body mass index is 42.97 kg/m  as calculated from the following:    Height as of this encounter: 1.607 m (5' 3.25\").    Weight as of this encounter: 110.9 kg (244 lb 8 oz).    Physical Exam  GENERAL: alert, no distress, and obese  EYES: Eyes grossly normal to inspection and conjunctivae and sclerae normal  HENT: ear canals and TM's normal, nose and mouth without ulcers or lesions  NECK: no adenopathy, no asymmetry, masses, or scars  RESP: lungs clear to auscultation - no rales, rhonchi or wheezes  BREAST: normal without masses, tenderness or nipple discharge and no palpable axillary masses or adenopathy  CV: regular rates and rhythm, no murmur, click or rub, peripheral pulses strong, and no peripheral edema  ABDOMEN: soft, nontender, no hepatosplenomegaly, no masses and bowel sounds normal   (female): deferred  MS: no gross musculoskeletal defects noted, no edema  SKIN: no suspicious lesions or rashes  NEURO: Normal strength and tone, mentation intact and speech normal  PSYCH: mentation appears normal, affect normal/bright  LYMPH: no cervical, supraclavicular, axillary, or inguinal adenopathy    Results for orders placed or performed in visit on 06/06/25   Lipid panel reflex to direct LDL Fasting     Status: Abnormal   Result Value Ref Range    Cholesterol 172 <200 mg/dL    Triglycerides 85 <150 mg/dL    Direct Measure HDL 49 (L) >=50 mg/dL    LDL Cholesterol Calculated 106 (H) <100 mg/dL    Non HDL Cholesterol 123 <130 mg/dL    Patient Fasting > 8hrs? No     Narrative    Cholesterol  Desirable: < 200 mg/dL  Borderline High: 200 - 239 mg/dL  High: >= 240 mg/dL    Triglycerides  Normal: < 150 mg/dL  Borderline High: 150 - 199 mg/dL  High: 200-499 mg/dL  Very High: >= 500 mg/dL    Direct Measure HDL  Female: >= 50 mg/dL   Male: >= 40 mg/dL    LDL Cholesterol  Desirable: < 100 mg/dL  Above Desirable: 100 - 129 mg/dL   Borderline High: 130 - 159 mg/dL   High:  160 - 189 mg/dL   Very " High: >= 190 mg/dL    Non HDL Cholesterol  Desirable: < 130 mg/dL  Above Desirable: 130 - 159 mg/dL  Borderline High: 160 - 189 mg/dL  High: 190 - 219 mg/dL  Very High: >= 220 mg/dL   CBC with platelets     Status: Normal   Result Value Ref Range    WBC Count 5.2 4.0 - 11.0 10e3/uL    RBC Count 4.46 3.80 - 5.20 10e6/uL    Hemoglobin 12.8 11.7 - 15.7 g/dL    Hematocrit 38.9 35.0 - 47.0 %    MCV 87 78 - 100 fL    MCH 28.7 26.5 - 33.0 pg    MCHC 32.9 31.5 - 36.5 g/dL    RDW 13.0 10.0 - 15.0 %    Platelet Count 235 150 - 450 10e3/uL   Comprehensive metabolic panel     Status: None   Result Value Ref Range    Sodium 139 135 - 145 mmol/L    Potassium 4.9 3.4 - 5.3 mmol/L    Carbon Dioxide (CO2) 26 22 - 29 mmol/L    Anion Gap 10 7 - 15 mmol/L    Urea Nitrogen 14.0 8.0 - 23.0 mg/dL    Creatinine 0.94 0.51 - 0.95 mg/dL    GFR Estimate 68 >60 mL/min/1.73m2    Calcium 10.0 8.8 - 10.4 mg/dL    Chloride 103 98 - 107 mmol/L    Glucose 98 70 - 99 mg/dL    Alkaline Phosphatase 77 40 - 150 U/L    AST 20 0 - 45 U/L    ALT 21 0 - 50 U/L    Protein Total 7.1 6.4 - 8.3 g/dL    Albumin 4.3 3.5 - 5.2 g/dL    Bilirubin Total 0.7 <=1.2 mg/dL    Patient Fasting > 8hrs? No    TSH with free T4 reflex     Status: Abnormal   Result Value Ref Range    TSH 4.44 (H) 0.30 - 4.20 uIU/mL   Hemoglobin A1c     Status: Normal   Result Value Ref Range    Estimated Average Glucose 105 <117 mg/dL    Hemoglobin A1C 5.3 0.0 - 5.6 %   Albumin Random Urine Quantitative with Creat Ratio     Status: None   Result Value Ref Range    Creatinine Urine mg/dL 27.5 mg/dL    Albumin Urine mg/L <12.0 mg/L    Albumin Urine mg/g Cr     Thyroid peroxidase antibody     Status: Normal   Result Value Ref Range    Thyroid Peroxidase Antibody <10 <35 IU/mL   T4 free     Status: Normal   Result Value Ref Range    Free T4 1.14 0.90 - 1.70 ng/dL         Signed Electronically by: ULLI Jacobsen CNP

## 2025-06-06 NOTE — PATIENT INSTRUCTIONS
PLAN:   1.   Symptomatic therapy suggested: will add on hydrochlorothiazide 25 mg a day for blood pressure and swelling  Increase calcium to 1000mg and 1000 international unit(s) Vit D daily .  2.  Orders Placed This Encounter   Medications    hydroCHLOROthiazide (HYDRODIURIL) 25 MG tablet     Sig: Take 1 tablet (25 mg) by mouth daily.     Dispense:  90 tablet     Refill:  3    COMPOUNDED NON-CONTROLLED SUBSTANCE (CMPD RX) - PHARMACY TO MIX COMPOUNDED MEDICATION     Sig: Place 0.5 mg under the tongue daily. Semiglutide 2mg/ml sublingual. Do not eat or drink 30 minutes after taking     Dispense:  30 mL     Refill:  0     Orders Placed This Encounter   Procedures    REVIEW OF HEALTH MAINTENANCE PROTOCOL ORDERS    DX Bone Density    Pneumococcal 20 Valent Conjugate (PCV20)    Lipid panel reflex to direct LDL Fasting    CBC with platelets    Comprehensive metabolic panel    TSH with free T4 reflex    Hemoglobin A1c    Albumin Random Urine Quantitative with Creat Ratio    Thyroid peroxidase antibody    Adult Oncology/Hematology  Referral       3.  FURTHER TESTING:       - DXA (bone density) scan       - mammogram  CONSULTATION/REFERRAL to genetic counseling   Will follow up and/or notify patient of  results via My Chart to determine further need for followup   Work on weight loss  Regular exercise  Follow up in 4 months.  Follow up office visit in one year for annual health maintenance exam, sooner PRN.   Patient needs to follow up in if no improvement,or sooner if worsening of symptoms or other symptoms develop.            Patient Education   Preventive Care Advice   This is general advice given by our system to help you stay healthy. However, your care team may have specific advice just for you. Please talk to your care team about your preventive care needs.  Nutrition  Eat 5 or more servings of fruits and vegetables each day.  Try wheat bread, brown rice and whole grain pasta (instead of white bread, rice,  and pasta).  Get enough calcium and vitamin D. Check the label on foods and aim for 100% of the RDA (recommended daily allowance).  Lifestyle  Exercise at least 150 minutes each week  (30 minutes a day, 5 days a week).  Do muscle strengthening activities 2 days a week. These help control your weight and prevent disease.  No smoking.  Wear sunscreen to prevent skin cancer.  Have a dental exam and cleaning every 6 months.  Yearly exams  See your health care team every year to talk about:  Any changes in your health.  Any medicines your care team has prescribed.  Preventive care, family planning, and ways to prevent chronic diseases.  Shots (vaccines)   HPV shots (up to age 26), if you've never had them before.  Hepatitis B shots (up to age 59), if you've never had them before.  COVID-19 shot: Get this shot when it's due.  Flu shot: Get a flu shot every year.  Tetanus shot: Get a tetanus shot every 10 years.  Pneumococcal, hepatitis A, and RSV shots: Ask your care team if you need these based on your risk.  Shingles shot (for age 50 and up)  General health tests  Diabetes screening:  Starting at age 35, Get screened for diabetes at least every 3 years.  If you are younger than age 35, ask your care team if you should be screened for diabetes.  Cholesterol test: At age 39, start having a cholesterol test every 5 years, or more often if advised.  Bone density scan (DEXA): At age 50, ask your care team if you should have this scan for osteoporosis (brittle bones).  Hepatitis C: Get tested at least once in your life.  STIs (sexually transmitted infections)  Before age 24: Ask your care team if you should be screened for STIs.  After age 24: Get screened for STIs if you're at risk. You are at risk for STIs (including HIV) if:  You are sexually active with more than one person.  You don't use condoms every time.  You or a partner was diagnosed with a sexually transmitted infection.  If you are at risk for HIV, ask about PrEP  medicine to prevent HIV.  Get tested for HIV at least once in your life, whether you are at risk for HIV or not.  Cancer screening tests  Cervical cancer screening: If you have a cervix, begin getting regular cervical cancer screening tests starting at age 21.  Breast cancer scan (mammogram): If you've ever had breasts, begin having regular mammograms starting at age 40. This is a scan to check for breast cancer.  Colon cancer screening: It is important to start screening for colon cancer at age 45.  Have a colonoscopy test every 10 years (or more often if you're at risk) Or, ask your provider about stool tests like a FIT test every year or Cologuard test every 3 years.  To learn more about your testing options, visit:   .  For help making a decision, visit:   https://bit.ly/ra49417.  Prostate cancer screening test: If you have a prostate, ask your care team if a prostate cancer screening test (PSA) at age 55 is right for you.  Lung cancer screening: If you are a current or former smoker ages 50 to 80, ask your care team if ongoing lung cancer screenings are right for you.  For informational purposes only. Not to replace the advice of your health care provider. Copyright   2023 [employer]. All rights reserved. Clinically reviewed by the [facility] Transitions Program. SCYNEXIS [id number] - REV 01/24.

## 2025-06-09 LAB — THYROPEROXIDASE AB SERPL-ACNC: <10 IU/ML

## 2025-06-26 ENCOUNTER — VIRTUAL VISIT (OUTPATIENT)
Dept: ONCOLOGY | Facility: CLINIC | Age: 65
End: 2025-06-26
Attending: NURSE PRACTITIONER
Payer: COMMERCIAL

## 2025-06-26 DIAGNOSIS — Z80.8 FAMILY HISTORY OF SKIN CANCER: ICD-10-CM

## 2025-06-26 DIAGNOSIS — Z80.3 FAMILY HISTORY OF MALIGNANT NEOPLASM OF BREAST: ICD-10-CM

## 2025-06-26 DIAGNOSIS — Z80.8 FAMILY HISTORY OF MALIGNANT NEOPLASM OF OTHER ORGANS OR SYSTEMS: ICD-10-CM

## 2025-06-26 DIAGNOSIS — Z84.81 FAMILY HISTORY OF GENE MUTATION: Primary | ICD-10-CM

## 2025-06-26 DIAGNOSIS — Z80.42 FAMILY HISTORY OF PROSTATE CANCER: ICD-10-CM

## 2025-06-26 PROCEDURE — 96041 GENETIC COUNSELING SVC EA 30: CPT | Mod: TEL,95 | Performed by: GENETIC COUNSELOR, MS

## 2025-06-26 NOTE — LETTER
Cancer Risk Management  Program     Mailing Address  Cancer Risk Management Program  [address]  [place] [address]    New patient appointments  [phone]    July 7, 2025    Susan Paulino  [address] [place] [address]    Dear Taylor,    It was a pleasure talking with you via your virtual genetic counseling visit on 6-.  Below is a copy of the progress note from that recent visit through the Cancer Risk Management Program.  If you have any additional questions, please feel free to contact me.    Cancer Risk Management Program Genetic Counseling Note    6/26/2025    Referring Provider: LULI Aranda, CNP     Presenting Information:   I had a telephone visit with Susan Paulino (Bev) today for genetic counseling through the Cancer Risk Management Program, in order to discuss her family history of breast, pancreatic, prostate, and other cancer. She presents today to review this history, cancer screening recommendations, and available genetic testing options.    Personal History:  Taylor is a 64 year old female. She does not have any personal history of cancer. In other medical history, Taylor has been diagnosed with hypertension, for which she has been prescribed medication. With respect to her cancer screening, Taylor had a mammogram in March 2023 that was negative, and she has a follow-up mammogram planned for next week. Taylor underwent a colonoscopy in April 2022, which did not note any colon polyps; she reports a follow-up colonoscopy was recommended for 10 years from then. Taylor reports no known history of abnormal Pap smears. She states that she has had a previous skin lesion treated with cryotherapy and a cyst removed from her tailbone in the past.    Family History: (Please see scanned pedigree for detailed family history information); the following information was provided by Taylor:  Taylor reports that her sister was diagnosed with a triple negative  breast cancer at age 66, for which she has undergone chemotherapy and a bilateral mastectomy. As part of her cancer assessment, Taylor reports that this sister underwent hereditary cancer genetic testing that identified a mutation in BARD1. (Taylor was able to later forward me a copy of the genetic details send to her by her sister; these records note that the BARD1 gene mutation see in Taylor's sister was called [id number], also known as p.[id number]*.)    Taylor states that another one of her sisters was diagnosed with breast cancer at age 50, for which she underwent surgery and chemotherapy. Taylor sates that this sister underwent hereditary cancer genetic testing several years ago (after her initial diagnosis) which was negative. (We talked about that given the timing of that cancer/testing, it seems unlikely that this genetic testing would have included BARD1, and so this sister may want to consider additional genetic testing.)    Taylor reports that one of her brothers was diagnosed with a basal cell skin cancer at age 50 on his forehead; she reports that he had a history of sun exposure that was likely related to that skin cancer diagnosis.    Taylor states that her mother was diagnosed with pancreatic cancer at age 68 and, unfortunately,  of complications of that cancer.    Taylor reports that she has a maternal uncle that was diagnosed with prostate cancer in his 80s.    On the other side of the family, Taylor reports that her father was diagnosed with prostate cancer at age 84, and she thinks that this prostate cancer spread and contributed to his death.    Taylor reports that one of her paternal aunts was diagnosed with breast cancer at an older age.    Taylor states that another one of her paternal aunts  at a relatively young age related to a metastatic cancer, but the primary site of cancer is not known.    Taylor is not aware of any other family members having had undergone hereditary cancer genetic testing besides her aforementioned  "sister with the BARD1 gene mutation.    In other family medical history, Taylor reports having a paternal first cousin that was born with an arm difference and intellectual disability; she is not aware of a specific genetic diagnosis in that individual. Taylor reports no known family history of infant/childhood deaths or obvious skin abnormalities.      Information about genetic ancestry was collected, as specific genetic variants may be more common in certain ethnic backgrounds. Taylor reports that her mother's family is of Portuguese genetic ancestry, and her father's family is of Portuguese genetic ancestry. There is no known Ashkenazi Mosque ancestry on either side of her family. There is no reported consanguinity.    Discussion:  We reviewed the features of sporadic, familial, and hereditary cancers. We discussed the natural history and genetics of hereditary cancer syndromes/genes in general. A detailed handout regarding some of the hereditary cancer genes and the information we discussed was provided to Taylor after our appointment today and can be found in the after visit summary. Topics included: inheritance pattern, cancer risks, cancer screening recommendations, and also risks, benefits and limitations of testing.    In particular, we also spent some today talking about the BARD1 gene (In the sections below, \"female\" refers to individuals who were assigned female at birth and \"male\" refers to individuals who were assigned male at birth. Various surgeries and hormonal medications may alter a particular individual's cancer risks.):    - Harmful mutations in the BARD1 gene are associated with an increased risk for female breast cancer. Females who inherit a BARD1 mutation have an estimated 17-30% lifetime risk of developing breast cancer. This is greater than the general population breast cancer risk of 12%.     - We talked about that there has been some published data that has suggested that triple negative breast cancers (as " was seen in Taylor's sister) may be more common in individuals with a BARD1 gene mutation (as compared to the general population).    - We discussed that the observed BARD1 mutation likely does not provide the entire explanation for her sister's breast cacner. It remains possible that there could be other genetic and/or environmental risk factors that, in combination with the BARD1 gene mutation, may be associated with the cancers in Taylor's family.     -The following screening is recommended for individuals who have a mutation in the BARD1 gene, per current National Comprehensive Cancer Network (NCCN) guidelines: Females with a BARD1 mutation qualify for increased-risk breast screening; this screening recommends annual mammogram and annual breast MRI (with and without contrast) starting at age 40 (mammograms and breast MRIs alternate every 6 months).     - There are currently no other specific cancer screening guidelines for other cancers potentially associated with a BARD1 mutation. As such, additional screening should be based on family history.     Based on her family history, Taylor meets current National Comprehensive Cancer Network (NCCN) criteria for genetic testing of the BARD1 gene, because of the finding of a BARD1 gene mutation in her sister. We talked about that it is likely that Taylor's sister inherited this BARD1 gene mutation from one of her parents; assuming this is correct, then Taylor would be predicted to be at a 50% chance to have inherited a copy of this BARD1 gene mutation as well. In addition, Taylor also meets NCCN guidelines for genetic testing for pancreatic cancer genes (such as JEFFREY, BRCA1, BRCA2, CDKN2A, MLH1, MSH2, MSH6, EPCAM, PALB2, STK11, and TP53), because of her mother's history of pancreatic cancer.     We discussed that there are additional genes besides those listed above that could cause increased risk for breast, pancreatic, prostate, and other cancer. As many of these genes present with  "overlapping features in a family and accurate cancer risk cannot always be established based upon the pedigree analysis alone, it would be reasonable for Taylor to consider panel genetic testing to analyze multiple genes at once. Moreover, current NCCN guidelines recommend that hereditary cancer genetic testing be done through a multigene panel as: \"An individual s personal and/or family history may be explained by more than one inherited cancer syndrome; thus, phenotype directed testing based on personal and family history through a tailored multigene panel test is often more efficient and cost effective and increases the yield of detecting a pathogenic/likely pathogenic variant in a gene that will impact medical management for the individual or their family members with increased risk.\"    Taylor stated that she was interested in pursuing genetic testing through a panel of genes associated with hereditary cancer syndromes, and so we reviewed the various panel options and their potential benefits and limitations.  After this conversation, Taylor decided that she was interested in genetic testing for the BARD1 gene through the Common Hereditary Cancers genetic testing panel through [facility].     The Common Hereditary Cancers genetic testing panel through [facility] includes analysis for 48 genes associated with cancers of the breast, ovary, uterus, prostate and gastrointestinal system: APC, JEFFREY, AXIN2, BAP1, BARD1, BMPR1A, BRCA1, BRCA2, BRIP1, CDH1, CDK4, CDKN2A (p14ARF and v72JKB1h), CHEK2, CTNNA1, DICER1, EPCAM, FH, GREM1, HOXB13, KIT, MBD4, MEN1, MLH1, MSH2, MSH3, MSH6, MUTYH, NF1, NTHL1, PALB2, PDGFRA, PMS2, POLD1, POLE, PTEN, RAD51C, RAD51D, SDHA, SDHB, SDHC, SDHD, SMAD4, SMARCA4, STK11, TP53, TSC1, TSC2, and VHL.      We discussed that many of these genes are associated with specific hereditary cancer syndromes and published management guidelines: Hereditary Breast and Ovarian Cancer syndrome (BRCA1, BRCA2), Herrera syndrome " (MLH1, MSH2, MSH6, PMS2, EPCAM), Familial Adenomatous Polyposis (APC), Hereditary Diffuse Gastric Cancer (CDH1), Familial Atypical Multiple Mole Melanoma syndrome (CDK4, CDKN2A), Multiple Endocrine Neoplasia type 1 (MEN1), Juvenile Polyposis syndrome (BMPR1A, SMAD4), Cowden syndrome (PTEN), Li Fraumeni syndrome (TP53), Hereditary Paraganglioma and Pheochromocytoma syndrome (SDHA, SDHB, SDHC, SDHD), Peutz-Jeghers syndrome (STK11), MUTYH Associated Polyposis (MUTYH), Tuberous sclerosis complex (TSC1, TSC2), Von Hippel-Lindau disease (VHL), and Neurofibromatosis type 1 (NF1). The JEFFREY, AXIN2, BRIP1, CHEK2, GREM1, MSH3, NBN, NTHL1, PALB2, POLD1, POLE, RAD51C, and RAD51D genes are associated with increased cancer risk and have published management guidelines for certain cancers. The remaining genes are associated with increased cancer risk and may allow us to make medical recommendations when mutations are identified.       Medical Management: For Taylor, we reviewed that the information from genetic testing may determine:  additional cancer screening for which Taylor may qualify (e.g.  mammogram and breast MRI, more frequent colonoscopies, more frequent dermatologic exams, etc.),  options for risk-reducing surgeries Taylor could consider, if indicated/desired (e.g.  bilateral mastectomy, surgery to remove her ovaries and/or uterus, etc.),    and targeted therapies for Taylor if she were to develop certain cancers in the future (e.g.  immunotherapy for individuals with Herrera syndrome, PARP inhibitors for HBOC syndrome, etc.).     These recommendations and possible targeted therapies will be discussed in detail once genetic testing is completed.     Plan:  1) Today, Taylor decided to proceed with BARD1 genetic testing through the Common Hereditary Cancers genetic testing panel through [facility].  Therefore, consent was reviewed and verbally obtained for this testing.      2) We reviewed the options for sample collection. Taylor plans to have  her blood drawn at her local [facility] lab. Test results are expected to be available within 4-6 weeks.    3) Taylor will either have a telephone visit or video visit to discuss the results.  Additional recommendations about screening will be made at that time.    Verena Wagner MS, [facility]  Genetic Counselor  Cancer Risk Management Program

## 2025-06-26 NOTE — LETTER
"done through a multigene panel as: \"An individual s personal and/or family history may be explained by more than one inherited cancer syndrome; thus, phenotype directed testing based on personal and family history through a tailored multigene panel test is often more efficient and cost effective and increases the yield of detecting a pathogenic/likely pathogenic variant in a gene that will impact medical management for the individual or their family members with increased risk.\"    Taylor stated that she was interested in pursuing genetic testing through a panel of genes associated with hereditary cancer syndromes, and so we reviewed the various panel options and their potential benefits and limitations.  After this conversation, Taylor decided that she was interested in genetic testing for the BARD1 gene through the Common Hereditary Cancers genetic testing panel through [facility].     The Common Hereditary Cancers genetic testing panel through [facility] includes analysis for 48 genes associated with cancers of the breast, ovary, uterus, prostate and gastrointestinal system: APC, JEFFREY, AXIN2, BAP1, BARD1, BMPR1A, BRCA1, BRCA2, BRIP1, CDH1, CDK4, CDKN2A (p14ARF and j81IOY3s), CHEK2, CTNNA1, DICER1, EPCAM, FH, GREM1, HOXB13, KIT, MBD4, MEN1, MLH1, MSH2, MSH3, MSH6, MUTYH, NF1, NTHL1, PALB2, PDGFRA, PMS2, POLD1, POLE, PTEN, RAD51C, RAD51D, SDHA, SDHB, SDHC, SDHD, SMAD4, SMARCA4, STK11, TP53, TSC1, TSC2, and VHL.      We discussed that many of these genes are associated with specific hereditary cancer syndromes and published management guidelines: Hereditary Breast and Ovarian Cancer syndrome (BRCA1, BRCA2), Herrera syndrome (MLH1, MSH2, MSH6, PMS2, EPCAM), Familial Adenomatous Polyposis (APC), Hereditary Diffuse Gastric Cancer (CDH1), Familial Atypical Multiple Mole Melanoma syndrome (CDK4, CDKN2A), Multiple Endocrine Neoplasia type 1 (MEN1), Juvenile Polyposis syndrome (BMPR1A, SMAD4), Cowden syndrome (PTEN), Li Fraumeni syndrome " (TP53), Hereditary Paraganglioma and Pheochromocytoma syndrome (SDHA, SDHB, SDHC, SDHD), Peutz-Jeghers syndrome (STK11), MUTYH Associated Polyposis (MUTYH), Tuberous sclerosis complex (TSC1, TSC2), Von Hippel-Lindau disease (VHL), and Neurofibromatosis type 1 (NF1). The JEFFREY, AXIN2, BRIP1, CHEK2, GREM1, MSH3, NBN, NTHL1, PALB2, POLD1, POLE, RAD51C, and RAD51D genes are associated with increased cancer risk and have published management guidelines for certain cancers. The remaining genes are associated with increased cancer risk and may allow us to make medical recommendations when mutations are identified.       Medical Management: For Taylor, we reviewed that the information from genetic testing may determine:  additional cancer screening for which Taylor may qualify (e.g.  mammogram and breast MRI, more frequent colonoscopies, more frequent dermatologic exams, etc.),  options for risk-reducing surgeries Taylor could consider, if indicated/desired (e.g.  bilateral mastectomy, surgery to remove her ovaries and/or uterus, etc.),    and targeted therapies for Taylor if she were to develop certain cancers in the future (e.g.  immunotherapy for individuals with Herrera syndrome, PARP inhibitors for HBOC syndrome, etc.).     These recommendations and possible targeted therapies will be discussed in detail once genetic testing is completed.     Plan:  1) Today, Taylor decided to proceed with BARD1 genetic testing through the Common Hereditary Cancers genetic testing panel through [facility].  Therefore, consent was reviewed and verbally obtained for this testing.      2) We reviewed the options for sample collection. Taylor plans to have her blood drawn at her local [facility] lab. Test results are expected to be available within 4-6 weeks.    3) Taylor will either have a telephone visit or video visit to discuss the results.  Additional recommendations about screening will be made at that time.    Verena Wagner MS, [facility]  Genetic  Counselor  Cancer Risk Management Program     I spent 72 minutes on the date of the encounter doing chart review, history and exam, documentation and further activities as noted above.    Virtual Visit Details    Type of service:  Telephone Visit   Phone call duration: 47 minutes   Originating Location (pt. Location): Home  Distant Location (provider location):  Off-site  Telephone visit completed due to the patient did not consent to a video visit.      Again, thank you for allowing me to participate in the care of your patient.        Sincerely,        Suzie Wagner GC    Electronically signed

## 2025-06-26 NOTE — NURSING NOTE
Current patient location: [place], [place]    Is the patient currently in the state of [place]? YES    Visit mode:TELEPHONE    If the visit is dropped, the patient can be reconnected by: TELEPHONE VISIT: Phone number:   Telephone Information:   Mobile [phone]       Will anyone else be joining the visit? NO  (If patient encounters technical issues they should call [phone] :[id number])    How would you like to obtain your AVS? MyChart    Are changes needed to the allergy or medication list? N/A    Are refills needed on medications prescribed by this physician? NO    Reason for visit: CONSULT    Yojana LI

## 2025-06-26 NOTE — Clinical Note
"6/26/2025      Susan Paulino  [address] [place] [address]      Dear Colleague,    Thank you for referring your patient, Susan Paulino, to the [facility]. Please see a copy of my visit note below.    Virtual Visit Details    Type of service:  Telephone Visit   Phone call duration: *** minutes   Originating Location (pt. Location): {patient location:346271::\"Home\"}  {PROVIDER LOCATION On-site should be selected for visits conducted from your clinic location or adjoining [facility] hospital, academic office, or other nearby [facility] building. Off-site should be selected for all other provider locations, including home:[id number]}  Distant Location (provider location):  {virtual location provider:140007}  Telephone visit completed due to {audio only reason:680628}    6/26/2025    Referring Provider: ***    Presenting Information:   I met with Susan Paulino today for genetic counseling at the Cancer Risk Management Program at the *** to discuss her personal and family history of *** cancer.  She is here today to review this history, cancer screening recommendations, and available genetic testing options.    Personal History:  Susan is a 64 year old female. She was diagnosed with ***; treatment included ***  / does not have any personal history of cancer.    ***She had her first menstrual period at age ***, her first child at age ***, and is ***.  ***Susan has her ovaries, fallopian tubes and uterus in place, and she has had *** ovarian cancer screening to date. She reports that she *** hormone replacement therapy.      She has *** clinical breast exams and mammograms; her most recent mammogram in *** was ***. *** Susan began having colonoscopies at the age of ***/Susan has not had a colonoscopy. Her most recent colonoscopy in *** was *** and follow-up was recommended in ***. *** She does not regularly do any other cancer screening at this time. Susan reported *** " "tobacco use and *** alcohol use.    Family History: (Please see scanned pedigree for detailed family history information)  ***  ***  Her maternal ethnicity is ***. Her paternal ethnicity is ***.  There is no known Ashkenazi Religion ancestry on either side of her family. There is no reported consanguinity.    Discussion:  Susan's personal and family history of *** is suggestive of a hereditary cancer syndrome.  We reviewed the features of sporadic, familial, and hereditary cancers. In looking at Susan's family history, it is possible that a cancer susceptibility gene is present due to ***.  We discussed the natural history and genetics of ***. A detailed handout regarding *** and the information we discussed was provided to Susan at the end of our appointment today and can be found in the after visit summary. Topics included: inheritance pattern, cancer risks, cancer screening recommendations, and also risks, benefits and limitations of testing.  ***  Based on her personal and family history, Susan meets current National Comprehensive Cancer Network (NCCN) criteria for genetic testing of ***.    We discussed that there are additional genes that could cause increased risk for *** cancer. As many of these genes present with overlapping features in a family and accurate cancer risk cannot always be established based upon the pedigree analysis alone, it would be reasonable for Susan to consider panel genetic testing to analyze multiple genes at once. Moreover, current NCCN guidelines recommend that hereditary cancer genetic testing be done through a multigene panel as: \"An individual s personal and/or family history may be explained by more than one inherited cancer syndrome; thus, phenotype directed testing based on personal and family history through a tailored multigene panel test is often more efficient and cost effective and increases the yield of detecting a pathogenic/likely pathogenic variant in a gene " "that will impact medical management for the individual or their family members with increased risk.\"  ***  Genetic testing is available for: ***.    Medical Management: For Susan, we reviewed that the information from genetic testing may determine:  ***surgery to treat Susan's active cancer diagnosis (i.e. lumpectomy versus bilateral mastectomy, partial versus total colectomy, etc.***),  additional cancer screening for which Susan may qualify (i.e. mammogram and breast MRI, more frequent colonoscopies, more frequent dermatologic exams, etc.***),  options for risk reducing surgeries Susan could consider (i.e. bilateral mastectomy, surgery to remove her ovaries and/or uterus, etc.***),    and targeted chemotherapies for Susan's *** active cancer, or ***if she were to develop certain cancers in the future (i.e. immunotherapy for individuals with Herrera syndrome, PARP inhibitors, etc.***).   These recommendations and possible targeted chemotherapies will be discussed in detail once genetic testing is completed.     Plan:  1) Today Susan elected to proceed with ***.  2) This information should be available in 4-6*** weeks.  3) Susan will return to clinic to discuss the results.    I spent *** minutes on the date of the encounter doing chart review, history and exam, documentation and further activities as noted above.    ***     Virtual Visit Details    Type of service:  Telephone Visit   Phone call duration: *** minutes   Originating Location (pt. Location): {patient location:139735::\"Home\"}  {PROVIDER LOCATION On-site should be selected for visits conducted from your clinic location or adjoining [facility] hospital, academic office, or other nearby [facility] building. Off-site should be selected for all other provider locations, including home:[id number]}  Distant Location (provider location):  {virtual location provider:[id number]}  Telephone visit completed due to {audio only reason:836370}      Again, thank you for " allowing me to participate in the care of your patient.        Sincerely,        Suzie Wagner GC    Electronically signed

## 2025-06-27 NOTE — PATIENT INSTRUCTIONS
Assessing Cancer Risk  Only about 5-10% of cancers are thought to be due to an inherited cancer susceptibility gene.    These families often have:  Several people with the same or related types of cancer  Cancers diagnosed at a young age (before age 50)  Individuals with more than one primary cancer  Multiple generations of the family affected with cancer    Genetic Testing  Genetic testing involves a blood or saliva test and will look at the genetic information in select genes for any harmful mutations that are associated with increased cancer risk.  If possible, it is recommended that the person(s) who has had cancer be tested before other family members.  That person will give us the most useful information about whether or not a specific gene is associated with the cancer in the family.     Results  There are three possible results of genetic testing:  Positive--a harmful mutation was identified  Negative--no mutation was identified  Variant of unknown significance--a variation in one of the genes was identified, but it is unclear how this impacts cancer risk in the family    Advantages and Disadvantages  There are advantages and disadvantages to genetic testing.    Advantages  May clarify your cancer risk  Can help you make medical decisions  May explain the cancers in your family  May give useful information to your family members (if you share your results)    Disadvantages  Possible negative emotional impact of learning about inherited cancer risk  Uncertainty in interpreting a negative test result in some situations  Possible genetic discrimination concerns (see below)    Inheritance   Mutations in most cancer risk genes are inherited in an autosomal dominant pattern.  This means that if a parent has a mutation, each of their children will have a 50% chance of inheriting that same mutation.  Therefore, each child would have a 50% chance of being at increased risk for developing cancer.                                               Image obtained from Genetics Home Reference, 2013     Genetic Information Nondiscrimination Act (LISA)  LISA is a federal law that protects individuals from health insurance or employment discrimination based on a genetic test result alone.  Although rare, there are currently no legal protections in terms of life insurance, long term care, or disability insurances.  Visit the National Human Genome Research Stanton at Genome.gov/36344334 to learn more.    Reducing Cancer Risk  If a harmful mutation is found in a cancer risk gene, there may be certain screening tests or preventative surgeries that can be offered. This information will be discussed after genetic testing is completed. If no mutations are found on genetic testing, screening is then recommended based on personal and/or family history of cancer.     Questions to Think About Regarding Genetic Testing  What effect will the test result have on me and my relationship with my family members if I have an inherited gene mutation?  If I don t have a gene mutation?  Should I share my test results, and how will my family react to this news, which may also affect them?  Are my children ready to learn new information that may one day affect their own health?    Resources  American Cancer Society (ACS) cancer.org   National Cancer [place] (NCI) cancer.gov     Please call us if you have any questions or concerns.   Cancer Risk Management Program [phone] ([phone])  Lennox Almeida, [place] [facility] [place] [phone]  Joellen Palacios, MS, [facility] [place] [phone]  Verena Wagner, MS, [facility] [place]  [email or web address]  Jenny Acosta, MS, [facility] [place]  [phone]  Edelmira Velez, MS, [facility]  [phone]  Alka Currie, MS, [facility] [place] [phone]  Claudia Swenson, MS, [facility] [place] [phone]

## 2025-07-03 ENCOUNTER — ANCILLARY PROCEDURE (OUTPATIENT)
Dept: BONE DENSITY | Facility: CLINIC | Age: 65
End: 2025-07-03
Attending: NURSE PRACTITIONER
Payer: COMMERCIAL

## 2025-07-03 ENCOUNTER — ANCILLARY PROCEDURE (OUTPATIENT)
Dept: MAMMOGRAPHY | Facility: CLINIC | Age: 65
End: 2025-07-03
Payer: COMMERCIAL

## 2025-07-03 DIAGNOSIS — E28.39 MENOPAUSE OVARIAN FAILURE: ICD-10-CM

## 2025-07-03 DIAGNOSIS — Z12.31 VISIT FOR SCREENING MAMMOGRAM: ICD-10-CM

## 2025-07-03 PROCEDURE — 77067 SCR MAMMO BI INCL CAD: CPT | Mod: GC

## 2025-07-03 PROCEDURE — 77080 DXA BONE DENSITY AXIAL: CPT | Performed by: RADIOLOGY

## 2025-07-03 PROCEDURE — 77063 BREAST TOMOSYNTHESIS BI: CPT | Mod: GC

## 2025-07-09 ENCOUNTER — LAB (OUTPATIENT)
Dept: LAB | Facility: CLINIC | Age: 65
End: 2025-07-09
Payer: COMMERCIAL

## 2025-07-09 DIAGNOSIS — Z80.8 FAMILY HISTORY OF MALIGNANT NEOPLASM OF OTHER ORGANS OR SYSTEMS: ICD-10-CM

## 2025-07-09 DIAGNOSIS — Z80.3 FAMILY HISTORY OF MALIGNANT NEOPLASM OF BREAST: ICD-10-CM

## 2025-07-09 DIAGNOSIS — Z80.8 FAMILY HISTORY OF SKIN CANCER: ICD-10-CM

## 2025-07-09 DIAGNOSIS — Z80.42 FAMILY HISTORY OF PROSTATE CANCER: ICD-10-CM

## 2025-07-09 DIAGNOSIS — Z84.81 FAMILY HISTORY OF GENE MUTATION: ICD-10-CM

## 2025-07-09 PROCEDURE — 36415 COLL VENOUS BLD VENIPUNCTURE: CPT

## 2025-07-10 LAB
LAB ORDER RESULT STATUS: NORMAL
PERFORMING LABORATORY: NORMAL
TEST NAME: NORMAL

## 2025-07-16 LAB
SCANNED LAB RESULT: NORMAL
TEST NAME: NORMAL

## 2025-08-30 DIAGNOSIS — I10 ESSENTIAL HYPERTENSION WITH GOAL BLOOD PRESSURE LESS THAN 140/90: ICD-10-CM

## 2025-09-02 RX ORDER — LISINOPRIL 20 MG/1
20 TABLET ORAL DAILY
Qty: 90 TABLET | Refills: 2 | Status: SHIPPED | OUTPATIENT
Start: 2025-09-02

## (undated) DEVICE — KIT ENDO FIRST STEP DISINFECTANT 200ML W/POUCH EP-4

## (undated) DEVICE — PAD CHUX UNDERPAD 23X24" 7136

## (undated) RX ORDER — FENTANYL CITRATE 50 UG/ML
INJECTION, SOLUTION INTRAMUSCULAR; INTRAVENOUS
Status: DISPENSED
Start: 2022-04-22